# Patient Record
Sex: MALE | Race: BLACK OR AFRICAN AMERICAN | NOT HISPANIC OR LATINO | Employment: UNEMPLOYED | RURAL
[De-identification: names, ages, dates, MRNs, and addresses within clinical notes are randomized per-mention and may not be internally consistent; named-entity substitution may affect disease eponyms.]

---

## 2020-07-27 ENCOUNTER — HISTORICAL (OUTPATIENT)
Dept: ADMINISTRATIVE | Facility: HOSPITAL | Age: 25
End: 2020-07-27

## 2021-04-14 ENCOUNTER — OFFICE VISIT (OUTPATIENT)
Dept: FAMILY MEDICINE | Facility: CLINIC | Age: 26
End: 2021-04-14
Payer: MEDICAID

## 2021-04-14 VITALS
OXYGEN SATURATION: 100 % | RESPIRATION RATE: 17 BRPM | SYSTOLIC BLOOD PRESSURE: 132 MMHG | DIASTOLIC BLOOD PRESSURE: 78 MMHG | HEIGHT: 76 IN | TEMPERATURE: 98 F | HEART RATE: 86 BPM | BODY MASS INDEX: 26.2 KG/M2 | WEIGHT: 215.19 LBS

## 2021-04-14 DIAGNOSIS — K04.7 DENTAL ABSCESS: Primary | ICD-10-CM

## 2021-04-14 PROCEDURE — 96372 PR INJECTION,THERAP/PROPH/DIAG2ST, IM OR SUBCUT: ICD-10-PCS | Mod: ,,, | Performed by: FAMILY MEDICINE

## 2021-04-14 PROCEDURE — 99213 PR OFFICE/OUTPT VISIT, EST, LEVL III, 20-29 MIN: ICD-10-PCS | Mod: 25,,, | Performed by: FAMILY MEDICINE

## 2021-04-14 PROCEDURE — 96372 THER/PROPH/DIAG INJ SC/IM: CPT | Mod: ,,, | Performed by: FAMILY MEDICINE

## 2021-04-14 PROCEDURE — 99213 OFFICE O/P EST LOW 20 MIN: CPT | Mod: 25,,, | Performed by: FAMILY MEDICINE

## 2021-04-14 RX ORDER — METFORMIN HYDROCHLORIDE 1000 MG/1
1000 TABLET ORAL 2 TIMES DAILY WITH MEALS
COMMUNITY
End: 2021-06-07 | Stop reason: SDUPTHER

## 2021-04-14 RX ORDER — INSULIN ASPART 100 [IU]/ML
INJECTION, SOLUTION INTRAVENOUS; SUBCUTANEOUS
COMMUNITY
Start: 2021-04-02 | End: 2021-06-07 | Stop reason: SDUPTHER

## 2021-04-14 RX ORDER — CLINDAMYCIN HYDROCHLORIDE 300 MG/1
300 CAPSULE ORAL 3 TIMES DAILY
Qty: 21 CAPSULE | Refills: 0 | Status: SHIPPED | OUTPATIENT
Start: 2021-04-14 | End: 2021-04-21

## 2021-04-14 RX ORDER — IBUPROFEN 800 MG/1
800 TABLET ORAL 3 TIMES DAILY PRN
Qty: 20 TABLET | Refills: 0 | Status: SHIPPED | OUTPATIENT
Start: 2021-04-14 | End: 2023-01-18

## 2021-04-14 RX ORDER — CLINDAMYCIN PHOSPHATE 150 MG/ML
300 INJECTION, SOLUTION INTRAVENOUS
Status: COMPLETED | OUTPATIENT
Start: 2021-04-14 | End: 2021-04-14

## 2021-04-14 RX ORDER — INSULIN GLARGINE 100 [IU]/ML
INJECTION, SOLUTION SUBCUTANEOUS
COMMUNITY
Start: 2021-04-02 | End: 2021-06-07 | Stop reason: SDUPTHER

## 2021-04-14 RX ORDER — KETOROLAC TROMETHAMINE 30 MG/ML
60 INJECTION, SOLUTION INTRAMUSCULAR; INTRAVENOUS
Status: COMPLETED | OUTPATIENT
Start: 2021-04-14 | End: 2021-04-14

## 2021-04-14 RX ORDER — TRAMADOL HYDROCHLORIDE 50 MG/1
50 TABLET ORAL EVERY 12 HOURS PRN
Qty: 10 TABLET | Refills: 0 | Status: SHIPPED | OUTPATIENT
Start: 2021-04-14 | End: 2023-03-23 | Stop reason: SDUPTHER

## 2021-04-14 RX ADMIN — KETOROLAC TROMETHAMINE 60 MG: 30 INJECTION, SOLUTION INTRAMUSCULAR; INTRAVENOUS at 03:04

## 2021-04-14 RX ADMIN — CLINDAMYCIN PHOSPHATE 300 MG: 150 INJECTION, SOLUTION INTRAVENOUS at 03:04

## 2021-06-07 ENCOUNTER — OFFICE VISIT (OUTPATIENT)
Dept: FAMILY MEDICINE | Facility: CLINIC | Age: 26
End: 2021-06-07
Payer: MEDICAID

## 2021-06-07 VITALS
OXYGEN SATURATION: 97 % | SYSTOLIC BLOOD PRESSURE: 115 MMHG | HEART RATE: 87 BPM | BODY MASS INDEX: 24.4 KG/M2 | WEIGHT: 200.38 LBS | TEMPERATURE: 98 F | DIASTOLIC BLOOD PRESSURE: 85 MMHG | HEIGHT: 76 IN

## 2021-06-07 DIAGNOSIS — Z11.59 ENCOUNTER FOR HEPATITIS C SCREENING TEST FOR LOW RISK PATIENT: ICD-10-CM

## 2021-06-07 DIAGNOSIS — J30.9 ALLERGIC RHINITIS, UNSPECIFIED SEASONALITY, UNSPECIFIED TRIGGER: ICD-10-CM

## 2021-06-07 DIAGNOSIS — Z79.4 TYPE 2 DIABETES MELLITUS WITH DIABETIC NEUROPATHY, WITH LONG-TERM CURRENT USE OF INSULIN: ICD-10-CM

## 2021-06-07 DIAGNOSIS — E11.40 TYPE 2 DIABETES MELLITUS WITH DIABETIC NEUROPATHY, WITH LONG-TERM CURRENT USE OF INSULIN: ICD-10-CM

## 2021-06-07 DIAGNOSIS — K04.7 DENTAL INFECTION: Chronic | ICD-10-CM

## 2021-06-07 DIAGNOSIS — R63.4 WEIGHT LOSS, UNINTENTIONAL: Primary | ICD-10-CM

## 2021-06-07 DIAGNOSIS — B37.0 ORAL THRUSH: ICD-10-CM

## 2021-06-07 DIAGNOSIS — Z72.0 SMOKING TRYING TO QUIT: ICD-10-CM

## 2021-06-07 LAB
ALBUMIN SERPL BCP-MCNC: 4.2 G/DL (ref 3.5–5)
ALBUMIN/GLOB SERPL: 1.2 {RATIO}
ALP SERPL-CCNC: 160 U/L (ref 45–115)
ALT SERPL W P-5'-P-CCNC: 24 U/L (ref 16–61)
ANION GAP SERPL CALCULATED.3IONS-SCNC: 11 MMOL/L (ref 7–16)
AST SERPL W P-5'-P-CCNC: 12 U/L (ref 15–37)
BASOPHILS # BLD AUTO: 0.02 K/UL (ref 0–0.2)
BASOPHILS NFR BLD AUTO: 0.5 % (ref 0–1)
BILIRUB SERPL-MCNC: 0.7 MG/DL (ref 0–1.2)
BUN SERPL-MCNC: 14 MG/DL (ref 7–18)
BUN/CREAT SERPL: 14 (ref 6–20)
CALCIUM SERPL-MCNC: 9.3 MG/DL (ref 8.5–10.1)
CHLORIDE SERPL-SCNC: 100 MMOL/L (ref 98–107)
CO2 SERPL-SCNC: 27 MMOL/L (ref 21–32)
CREAT SERPL-MCNC: 1.01 MG/DL (ref 0.7–1.3)
DIFFERENTIAL METHOD BLD: ABNORMAL
EOSINOPHIL # BLD AUTO: 0.03 K/UL (ref 0–0.5)
EOSINOPHIL NFR BLD AUTO: 0.8 % (ref 1–4)
ERYTHROCYTE [DISTWIDTH] IN BLOOD BY AUTOMATED COUNT: 11.9 % (ref 11.5–14.5)
EST. AVERAGE GLUCOSE BLD GHB EST-MCNC: 340 MG/DL
GLOBULIN SER-MCNC: 3.6 G/DL (ref 2–4)
GLUCOSE SERPL-MCNC: 369 MG/DL (ref 74–106)
HBA1C MFR BLD HPLC: 12.8 % (ref 4.5–6.6)
HCT VFR BLD AUTO: 46.6 % (ref 40–54)
HCV AB SER QL: NORMAL
HGB BLD-MCNC: 15.7 G/DL (ref 13.5–18)
HIV 1+O+2 AB SERPL QL: NORMAL
IMM GRANULOCYTES # BLD AUTO: 0.01 K/UL (ref 0–0.04)
IMM GRANULOCYTES NFR BLD: 0.3 % (ref 0–0.4)
LYMPHOCYTES # BLD AUTO: 1.15 K/UL (ref 1–4.8)
LYMPHOCYTES NFR BLD AUTO: 29.2 % (ref 27–41)
MCH RBC QN AUTO: 30 PG (ref 27–31)
MCHC RBC AUTO-ENTMCNC: 33.7 G/DL (ref 32–36)
MCV RBC AUTO: 89.1 FL (ref 80–96)
MONOCYTES # BLD AUTO: 0.57 K/UL (ref 0–0.8)
MONOCYTES NFR BLD AUTO: 14.5 % (ref 2–6)
MPC BLD CALC-MCNC: 12.8 FL (ref 9.4–12.4)
NEUTROPHILS # BLD AUTO: 2.16 K/UL (ref 1.8–7.7)
NEUTROPHILS NFR BLD AUTO: 54.7 % (ref 53–65)
NRBC # BLD AUTO: 0 X10E3/UL
NRBC, AUTO (.00): 0 %
PLATELET # BLD AUTO: 206 K/UL (ref 150–400)
POTASSIUM SERPL-SCNC: 4.1 MMOL/L (ref 3.5–5.1)
PROT SERPL-MCNC: 7.8 G/DL (ref 6.4–8.2)
RBC # BLD AUTO: 5.23 M/UL (ref 4.6–6.2)
SODIUM SERPL-SCNC: 134 MMOL/L (ref 136–145)
T4 FREE SERPL-MCNC: 1.55 NG/DL (ref 0.76–1.46)
TSH SERPL DL<=0.005 MIU/L-ACNC: 0.76 UIU/ML (ref 0.36–3.74)
WBC # BLD AUTO: 3.94 K/UL (ref 4.5–11)

## 2021-06-07 PROCEDURE — 84439 ASSAY OF FREE THYROXINE: CPT | Mod: ,,, | Performed by: CLINICAL MEDICAL LABORATORY

## 2021-06-07 PROCEDURE — 86803 HEPATITIS C ANTIBODY: ICD-10-PCS | Mod: ,,, | Performed by: CLINICAL MEDICAL LABORATORY

## 2021-06-07 PROCEDURE — 84443 THYROID PANEL: ICD-10-PCS | Mod: ,,, | Performed by: CLINICAL MEDICAL LABORATORY

## 2021-06-07 PROCEDURE — 85025 COMPLETE CBC W/AUTO DIFF WBC: CPT | Mod: ,,, | Performed by: CLINICAL MEDICAL LABORATORY

## 2021-06-07 PROCEDURE — 80053 COMPREHENSIVE METABOLIC PANEL: ICD-10-PCS | Mod: ,,, | Performed by: CLINICAL MEDICAL LABORATORY

## 2021-06-07 PROCEDURE — 86703 HIV-1/HIV-2 1 RESULT ANTBDY: CPT | Mod: ,,, | Performed by: CLINICAL MEDICAL LABORATORY

## 2021-06-07 PROCEDURE — 86703 HIV 1 / 2 ANTIBODY: ICD-10-PCS | Mod: ,,, | Performed by: CLINICAL MEDICAL LABORATORY

## 2021-06-07 PROCEDURE — 83036 HEMOGLOBIN A1C: ICD-10-PCS | Mod: ,,, | Performed by: CLINICAL MEDICAL LABORATORY

## 2021-06-07 PROCEDURE — 84443 ASSAY THYROID STIM HORMONE: CPT | Mod: ,,, | Performed by: CLINICAL MEDICAL LABORATORY

## 2021-06-07 PROCEDURE — 83036 HEMOGLOBIN GLYCOSYLATED A1C: CPT | Mod: ,,, | Performed by: CLINICAL MEDICAL LABORATORY

## 2021-06-07 PROCEDURE — 85025 CBC WITH DIFFERENTIAL: ICD-10-PCS | Mod: ,,, | Performed by: CLINICAL MEDICAL LABORATORY

## 2021-06-07 PROCEDURE — 99214 PR OFFICE/OUTPT VISIT, EST, LEVL IV, 30-39 MIN: ICD-10-PCS | Mod: GC,,, | Performed by: FAMILY MEDICINE

## 2021-06-07 PROCEDURE — 86803 HEPATITIS C AB TEST: CPT | Mod: ,,, | Performed by: CLINICAL MEDICAL LABORATORY

## 2021-06-07 PROCEDURE — 99214 OFFICE O/P EST MOD 30 MIN: CPT | Mod: GC,,, | Performed by: FAMILY MEDICINE

## 2021-06-07 PROCEDURE — 80053 COMPREHEN METABOLIC PANEL: CPT | Mod: ,,, | Performed by: CLINICAL MEDICAL LABORATORY

## 2021-06-07 PROCEDURE — 84439 THYROID PANEL: ICD-10-PCS | Mod: ,,, | Performed by: CLINICAL MEDICAL LABORATORY

## 2021-06-07 RX ORDER — INSULIN GLARGINE 100 [IU]/ML
INJECTION, SOLUTION SUBCUTANEOUS
Qty: 3 ML | Refills: 11 | Status: SHIPPED | OUTPATIENT
Start: 2021-06-07 | End: 2021-06-17 | Stop reason: SDUPTHER

## 2021-06-07 RX ORDER — FLUTICASONE PROPIONATE 50 MCG
1 SPRAY, SUSPENSION (ML) NASAL DAILY
Qty: 9.9 ML | Refills: 3 | Status: SHIPPED | OUTPATIENT
Start: 2021-06-07 | End: 2023-01-18

## 2021-06-07 RX ORDER — METFORMIN HYDROCHLORIDE 1000 MG/1
1000 TABLET ORAL 2 TIMES DAILY WITH MEALS
Qty: 60 TABLET | Refills: 3 | Status: SHIPPED | OUTPATIENT
Start: 2021-06-07 | End: 2023-01-18

## 2021-06-07 RX ORDER — IBUPROFEN 200 MG
1 TABLET ORAL DAILY
Qty: 14 PATCH | Refills: 0 | Status: SHIPPED | OUTPATIENT
Start: 2021-06-07 | End: 2021-06-21

## 2021-06-07 RX ORDER — NICOTINE 7MG/24HR
1 PATCH, TRANSDERMAL 24 HOURS TRANSDERMAL DAILY
Qty: 14 PATCH | Refills: 0 | Status: SHIPPED | OUTPATIENT
Start: 2021-06-07 | End: 2023-01-18

## 2021-06-07 RX ORDER — NYSTATIN 100000 [USP'U]/ML
4 SUSPENSION ORAL 4 TIMES DAILY
Qty: 160 ML | Refills: 0 | Status: SHIPPED | OUTPATIENT
Start: 2021-06-07 | End: 2021-06-17

## 2021-06-07 RX ORDER — INSULIN ASPART 100 [IU]/ML
INJECTION, SOLUTION INTRAVENOUS; SUBCUTANEOUS
Qty: 3 ML | Refills: 11 | Status: SHIPPED | OUTPATIENT
Start: 2021-06-07 | End: 2021-06-17

## 2021-06-08 ENCOUNTER — TELEPHONE (OUTPATIENT)
Dept: FAMILY MEDICINE | Facility: CLINIC | Age: 26
End: 2021-06-08

## 2021-06-08 DIAGNOSIS — K04.7 DENTAL INFECTION: Primary | ICD-10-CM

## 2021-06-08 RX ORDER — AMOXICILLIN 500 MG/1
CAPSULE ORAL
Qty: 11 CAPSULE | Refills: 0 | Status: SHIPPED | OUTPATIENT
Start: 2021-06-09 | End: 2021-06-13

## 2021-08-02 LAB
LEFT EYE DM RETINOPATHY: NEGATIVE
RIGHT EYE DM RETINOPATHY: NEGATIVE

## 2022-01-03 ENCOUNTER — APPOINTMENT (OUTPATIENT)
Dept: RADIOLOGY | Facility: CLINIC | Age: 27
End: 2022-01-03
Attending: FAMILY MEDICINE
Payer: MEDICAID

## 2022-01-03 ENCOUNTER — OFFICE VISIT (OUTPATIENT)
Dept: FAMILY MEDICINE | Facility: CLINIC | Age: 27
End: 2022-01-03
Payer: MEDICAID

## 2022-01-03 VITALS
HEIGHT: 76 IN | BODY MASS INDEX: 22.53 KG/M2 | WEIGHT: 185 LBS | DIASTOLIC BLOOD PRESSURE: 82 MMHG | OXYGEN SATURATION: 97 % | RESPIRATION RATE: 18 BRPM | HEART RATE: 79 BPM | TEMPERATURE: 97 F | SYSTOLIC BLOOD PRESSURE: 127 MMHG

## 2022-01-03 DIAGNOSIS — R63.4 WEIGHT LOSS, UNINTENTIONAL: ICD-10-CM

## 2022-01-03 DIAGNOSIS — Z79.4 TYPE 2 DIABETES MELLITUS WITH DIABETIC NEUROPATHY, WITH LONG-TERM CURRENT USE OF INSULIN: Primary | ICD-10-CM

## 2022-01-03 DIAGNOSIS — E11.40 TYPE 2 DIABETES MELLITUS WITH DIABETIC NEUROPATHY, WITH LONG-TERM CURRENT USE OF INSULIN: Primary | ICD-10-CM

## 2022-01-03 DIAGNOSIS — M41.9 SCOLIOSIS, UNSPECIFIED SCOLIOSIS TYPE, UNSPECIFIED SPINAL REGION: ICD-10-CM

## 2022-01-03 DIAGNOSIS — M54.9 BACK PAIN, UNSPECIFIED BACK LOCATION, UNSPECIFIED BACK PAIN LATERALITY, UNSPECIFIED CHRONICITY: ICD-10-CM

## 2022-01-03 DIAGNOSIS — R45.89 DEPRESSED MOOD: ICD-10-CM

## 2022-01-03 LAB
ALBUMIN SERPL BCP-MCNC: 3.7 G/DL (ref 3.5–5)
ALBUMIN/GLOB SERPL: 0.9 {RATIO}
ALP SERPL-CCNC: 139 U/L (ref 45–115)
ALT SERPL W P-5'-P-CCNC: 33 U/L (ref 16–61)
ANION GAP SERPL CALCULATED.3IONS-SCNC: 6 MMOL/L (ref 7–16)
AST SERPL W P-5'-P-CCNC: 16 U/L (ref 15–37)
BASOPHILS # BLD AUTO: 0.02 K/UL (ref 0–0.2)
BASOPHILS NFR BLD AUTO: 0.3 % (ref 0–1)
BILIRUB SERPL-MCNC: 0.6 MG/DL (ref 0–1.2)
BUN SERPL-MCNC: 7 MG/DL (ref 7–18)
BUN/CREAT SERPL: 8 (ref 6–20)
CALCIUM SERPL-MCNC: 9.3 MG/DL (ref 8.5–10.1)
CHLORIDE SERPL-SCNC: 99 MMOL/L (ref 98–107)
CO2 SERPL-SCNC: 33 MMOL/L (ref 21–32)
CREAT SERPL-MCNC: 0.87 MG/DL (ref 0.7–1.3)
DIFFERENTIAL METHOD BLD: ABNORMAL
EOSINOPHIL # BLD AUTO: 0.05 K/UL (ref 0–0.5)
EOSINOPHIL NFR BLD AUTO: 0.8 % (ref 1–4)
ERYTHROCYTE [DISTWIDTH] IN BLOOD BY AUTOMATED COUNT: 11.9 % (ref 11.5–14.5)
GLOBULIN SER-MCNC: 4.2 G/DL (ref 2–4)
GLUCOSE SERPL-MCNC: 431 MG/DL (ref 74–106)
GLUCOSE SERPL-MCNC: 437 MG/DL (ref 70–110)
HCT VFR BLD AUTO: 45.4 % (ref 40–54)
HGB BLD-MCNC: 15.4 G/DL (ref 13.5–18)
IMM GRANULOCYTES # BLD AUTO: 0.02 K/UL (ref 0–0.04)
IMM GRANULOCYTES NFR BLD: 0.3 % (ref 0–0.4)
LYMPHOCYTES # BLD AUTO: 2.28 K/UL (ref 1–4.8)
LYMPHOCYTES NFR BLD AUTO: 36.2 % (ref 27–41)
MCH RBC QN AUTO: 30.4 PG (ref 27–31)
MCHC RBC AUTO-ENTMCNC: 33.9 G/DL (ref 32–36)
MCV RBC AUTO: 89.5 FL (ref 80–96)
MONOCYTES # BLD AUTO: 0.48 K/UL (ref 0–0.8)
MONOCYTES NFR BLD AUTO: 7.6 % (ref 2–6)
MPC BLD CALC-MCNC: 12.2 FL (ref 9.4–12.4)
NEUTROPHILS # BLD AUTO: 3.44 K/UL (ref 1.8–7.7)
NEUTROPHILS NFR BLD AUTO: 54.8 % (ref 53–65)
NRBC # BLD AUTO: 0 X10E3/UL
NRBC, AUTO (.00): 0 %
PLATELET # BLD AUTO: 273 K/UL (ref 150–400)
POTASSIUM SERPL-SCNC: 3.9 MMOL/L (ref 3.5–5.1)
PROT SERPL-MCNC: 7.9 G/DL (ref 6.4–8.2)
RBC # BLD AUTO: 5.07 M/UL (ref 4.6–6.2)
SODIUM SERPL-SCNC: 134 MMOL/L (ref 136–145)
WBC # BLD AUTO: 6.29 K/UL (ref 4.5–11)

## 2022-01-03 PROCEDURE — 99214 PR OFFICE/OUTPT VISIT, EST, LEVL IV, 30-39 MIN: ICD-10-PCS | Mod: GC,,, | Performed by: FAMILY MEDICINE

## 2022-01-03 PROCEDURE — 80053 COMPREHENSIVE METABOLIC PANEL: ICD-10-PCS | Mod: ,,, | Performed by: CLINICAL MEDICAL LABORATORY

## 2022-01-03 PROCEDURE — 72081 X-RAY EXAM ENTIRE SPI 1 VW: CPT | Mod: 26,,, | Performed by: RADIOLOGY

## 2022-01-03 PROCEDURE — 99214 OFFICE O/P EST MOD 30 MIN: CPT | Mod: GC,,, | Performed by: FAMILY MEDICINE

## 2022-01-03 PROCEDURE — 85025 CBC WITH DIFFERENTIAL: ICD-10-PCS | Mod: ,,, | Performed by: CLINICAL MEDICAL LABORATORY

## 2022-01-03 PROCEDURE — 72081 X-RAY EXAM ENTIRE SPI 1 VW: CPT | Mod: TC,RHCUB | Performed by: FAMILY MEDICINE

## 2022-01-03 PROCEDURE — 83036 HEMOGLOBIN A1C: ICD-10-PCS | Mod: 90,,, | Performed by: CLINICAL MEDICAL LABORATORY

## 2022-01-03 PROCEDURE — 85025 COMPLETE CBC W/AUTO DIFF WBC: CPT | Mod: ,,, | Performed by: CLINICAL MEDICAL LABORATORY

## 2022-01-03 PROCEDURE — 72081 XR SPINE SCOLIOSIS 1 VIEW_SUPINE OR ERECT: ICD-10-PCS | Mod: 26,,, | Performed by: RADIOLOGY

## 2022-01-03 PROCEDURE — 83036 HEMOGLOBIN GLYCOSYLATED A1C: CPT | Mod: 90,,, | Performed by: CLINICAL MEDICAL LABORATORY

## 2022-01-03 PROCEDURE — 80053 COMPREHEN METABOLIC PANEL: CPT | Mod: ,,, | Performed by: CLINICAL MEDICAL LABORATORY

## 2022-01-03 RX ORDER — ESCITALOPRAM OXALATE 10 MG/1
10 TABLET ORAL DAILY
Qty: 30 TABLET | Refills: 0 | Status: SHIPPED | OUTPATIENT
Start: 2022-01-03 | End: 2022-01-05 | Stop reason: SDUPTHER

## 2022-01-03 NOTE — PROGRESS NOTES
"        Celso Lainez, DO  [address], [place] [address]  Phone: [phone]     Subjective     Name: Low Gutierrez   YOB: 1995 (26 y.o.)  MRN: [id number]  Visit Date: 01/03/2022   Chief Complaint: Check Up (Diabetes)        HISTORY OF PRESENT ILLNESS:  Patient is a 27 yo  male presenting today with a cc of "diabetes check up." At last visit (6/7/21) patients BG was noted to be 369 with a1c of 12.8. He states that since his last visit he has continued to lose weight, a total of about 50 lbs as of today. He states that his appetite is still decreased ever since having COVID-19 several months ago. The accucheck at this visit revealed a BG of 427. I discussed with the patient about his medication adherence, he stated that he has been taking his metformin regularly, skipping his meal time insulin, and taking his 22 units at night. After further discussion, it appears as tho the patient might have been taking novolog 22 units at night instead of detemir. Patient states that he does not like having to stick himself multiple times a day.    Patient states that he has a history of scoliosis and has been having significant back pain for about 6 months now. I will get an xray to see severity of scoliosis. The patient will be scheduled for OMT with me in 1 week.    The patient also states that he has been depressed and stressed lately. He describes getting very angry about small things and not enjoying activities that he used to enjoy. He also endorses insomnia and lack of appetite. Will start on Lexapro.        PAST MEDICAL HISTORY:  Significant Diagnoses - Patient  has a past medical history of Diabetes mellitus, type 2.  Medications - Patient has a current medication list which includes the following long-term medication(s): insulin aspart u-100, levemir flextouch u-100 insuln, metformin, and escitalopram oxalate.   Allergies - Patient is allergic to wasp venom.  Surgeries - " Patient  has no past surgical history on file.  Family History - Patient family history is not on file.      SOCIAL HISTORY:  Tobacco - Patient  reports that he has been smoking cigarettes. He has never used smokeless tobacco.   Alcohol - Patient  reports current alcohol use.   Recreational Drugs - Patient  reports no history of drug use.       Review of Systems   Constitutional: Positive for appetite change and fatigue. Negative for chills, diaphoresis and fever.   Respiratory: Negative for cough, chest tightness, shortness of breath and wheezing.    Cardiovascular: Negative for chest pain, palpitations and leg swelling.   Gastrointestinal: Negative for abdominal distention, abdominal pain, diarrhea, nausea and vomiting.   Musculoskeletal: Positive for back pain. Negative for arthralgias, gait problem and myalgias.   Psychiatric/Behavioral: Positive for agitation and sleep disturbance.   All other systems reviewed and are negative.         Past Medical History:   Diagnosis Date    Diabetes mellitus, type 2         Review of patient's allergies indicates:   Allergen Reactions    Wasp venom         History reviewed. No pertinent surgical history.     History reviewed. No pertinent family history.    Current Outpatient Medications   Medication Instructions    EScitalopram oxalate (LEXAPRO) 10 mg, Oral, Daily    fluticasone propionate (FLONASE) 50 mcg, Each Nostril, Daily    ibuprofen (ADVIL,MOTRIN) 800 mg, Oral, 3 times daily PRN    insulin aspart U-100 (NOVOLOG) 100 unit/mL (3 mL) InPn pen INJECT 7 UNITS SUB-Q 3 TIMES DAILY BEFORE MEALS    LEVEMIR FLEXTOUCH U-100 INSULN 22 Units, Subcutaneous, Daily    metFORMIN (GLUCOPHAGE) 1,000 mg, Oral, 2 times daily with meals    nicotine (NICODERM CQ) 7 mg/24 hr 1 patch, Transdermal, Daily    traMADoL (ULTRAM) 50 mg, Oral, Every 12 hours PRN        Objective     /82 (BP Location: Left arm, Patient Position: Sitting)   Pulse 79   Temp 97 °F (36.1 °C) (Temporal)  "  Resp 18   Ht 6' 4" (1.93 m)   Wt 83.9 kg (185 lb)   SpO2 97%   BMI 22.52 kg/m²     Physical Exam  Vitals and nursing note reviewed.   Constitutional:       General: He is awake. He is not in acute distress.     Appearance: Normal appearance. He is well-developed and normal weight. He is not ill-appearing.   HENT:      Head: Normocephalic and atraumatic.      Nose: Nose normal. No congestion or rhinorrhea.      Mouth/Throat:      Mouth: Mucous membranes are dry.      Pharynx: Oropharynx is clear. No oropharyngeal exudate or posterior oropharyngeal erythema.   Eyes:      General: No scleral icterus.     Extraocular Movements: Extraocular movements intact.      Conjunctiva/sclera: Conjunctivae normal.      Pupils: Pupils are equal, round, and reactive to light.   Cardiovascular:      Rate and Rhythm: Normal rate and regular rhythm.      Pulses: Normal pulses.      Heart sounds: Normal heart sounds, S1 normal and S2 normal. No murmur heard.  No friction rub. No gallop.    Pulmonary:      Effort: Pulmonary effort is normal. No accessory muscle usage, prolonged expiration or respiratory distress.      Breath sounds: Normal breath sounds and air entry. No wheezing, rhonchi or rales.   Abdominal:      General: Abdomen is flat. Bowel sounds are normal. There is no distension.      Palpations: Abdomen is soft.      Tenderness: There is no abdominal tenderness. There is no guarding or rebound.   Musculoskeletal:         General: Normal range of motion.      Cervical back: Normal range of motion and neck supple.      Right lower leg: No edema.      Left lower leg: No edema.   Skin:     General: Skin is warm and dry.   Neurological:      General: No focal deficit present.      Mental Status: He is alert and oriented to person, place, and time. Mental status is at baseline.   Psychiatric:         Mood and Affect: Mood is depressed.         Behavior: Behavior normal. Behavior is cooperative.         Thought Content: Thought " content normal.         Judgment: Judgment normal.          All recently obtained labs have been reviewed and discussed in detail with the patient.   Assessment     1. Type 2 diabetes mellitus with diabetic neuropathy, with long-term current use of insulin    2. Weight loss, unintentional    3. Scoliosis, unspecified scoliosis type, unspecified spinal region    4. Depressed mood    5. Back pain, unspecified back location, unspecified back pain laterality, unspecified chronicity         Plan        Problem List Items Addressed This Visit        Psychiatric    Depressed mood     Patient states that he thinks he needs a medication for depression at this time.  - Begin Lexapro 10mg qhs  - Follow up 1 month            Endocrine    Weight loss, unintentional    Relevant Orders    Comprehensive Metabolic Panel (Completed)    CBC Auto Differential (Completed)    Type 2 diabetes mellitus with diabetic neuropathy, with long-term current use of insulin - Primary     Patients BG was 427 today. He states that he has not been taking his insulin as described above.  - Stop meal time insulin  - Only take Detemir 25 units qhs  - Instructed to check BG at least 2x per day for the next month  - C peptide, a1c pending  - Follow up 1 month         Relevant Orders    Hemoglobin A1C    POCT glucose (Completed)    C-Peptide       Orthopedic    Scoliosis    Relevant Orders    X-Ray Spine Scoliosis 1 View_Supine or Erect (Completed)    Back pain     Pt reports approximately 6 mo history of back pain.  - Xray pending  - OMT scheduled for 1 week               Follow up in about 1 week (around 1/10/2022), or if symptoms worsen or fail to improve, for OMT of back.    Celso Lainez DO  [facility]

## 2022-01-03 NOTE — ASSESSMENT & PLAN NOTE
Patients BG was 427 today. He states that he has not been taking his insulin as described above.  - Stop meal time insulin  - Only take Detemir 25 units qhs  - Instructed to check BG at least 2x per day for the next month  - C peptide, a1c pending  - Follow up 1 month

## 2022-01-03 NOTE — PROGRESS NOTES
Results of POC Glucose discussed in person with patient. Patient states that he has not been taking insulin as he should. It appears as tho he might have been taking his Novalog 22 units at night instead of Levemir. Patient states that he doesn't like sticking himself multiple times a day. Will get patient to stop with meal insulin, and increase levemir to 22 units qhs, follow up one month. Patient states that he will be compliant with this regimen.

## 2022-01-03 NOTE — ASSESSMENT & PLAN NOTE
Patient states that he thinks he needs a medication for depression at this time.  - Begin Lexapro 10mg qhs  - Follow up 1 month

## 2022-01-05 LAB — HBA1C MFR BLD: 15.9 % (ref 4–5.6)

## 2022-01-05 RX ORDER — ESCITALOPRAM OXALATE 10 MG/1
10 TABLET ORAL DAILY
Qty: 30 TABLET | Refills: 0 | Status: SHIPPED | OUTPATIENT
Start: 2022-01-05 | End: 2022-02-04

## 2023-01-04 ENCOUNTER — OFFICE VISIT (OUTPATIENT)
Dept: FAMILY MEDICINE | Facility: CLINIC | Age: 28
End: 2023-01-04
Payer: MEDICARE

## 2023-01-04 VITALS
DIASTOLIC BLOOD PRESSURE: 90 MMHG | RESPIRATION RATE: 20 BRPM | SYSTOLIC BLOOD PRESSURE: 135 MMHG | WEIGHT: 150.63 LBS | HEART RATE: 109 BPM | HEIGHT: 76 IN | BODY MASS INDEX: 18.34 KG/M2 | OXYGEN SATURATION: 97 % | TEMPERATURE: 98 F

## 2023-01-04 DIAGNOSIS — Z23 NEED FOR VACCINATION: ICD-10-CM

## 2023-01-04 DIAGNOSIS — Z79.4 TYPE 2 DIABETES MELLITUS WITH DIABETIC NEUROPATHY, WITH LONG-TERM CURRENT USE OF INSULIN: Primary | ICD-10-CM

## 2023-01-04 DIAGNOSIS — Z91.199 NONCOMPLIANCE WITH DIABETES TREATMENT: ICD-10-CM

## 2023-01-04 DIAGNOSIS — E13.69 OTHER SPECIFIED DIABETES MELLITUS WITH OTHER SPECIFIED COMPLICATION, WITH LONG-TERM CURRENT USE OF INSULIN: ICD-10-CM

## 2023-01-04 DIAGNOSIS — R63.4 WEIGHT LOSS, UNINTENTIONAL: ICD-10-CM

## 2023-01-04 DIAGNOSIS — E11.40 TYPE 2 DIABETES MELLITUS WITH DIABETIC NEUROPATHY, WITH LONG-TERM CURRENT USE OF INSULIN: Primary | ICD-10-CM

## 2023-01-04 DIAGNOSIS — Z79.4 OTHER SPECIFIED DIABETES MELLITUS WITH OTHER SPECIFIED COMPLICATION, WITH LONG-TERM CURRENT USE OF INSULIN: ICD-10-CM

## 2023-01-04 DIAGNOSIS — E08.21 DIABETES DUE TO UNDERLYING CONDITION W DIABETIC NEPHROPATHY: ICD-10-CM

## 2023-01-04 LAB
ACETONE SERPL QL SCN: NEGATIVE
ALBUMIN SERPL BCP-MCNC: 4.4 G/DL (ref 3.5–5)
ALBUMIN/GLOB SERPL: 1.3 {RATIO}
ALP SERPL-CCNC: 110 U/L (ref 45–115)
ALT SERPL W P-5'-P-CCNC: 18 U/L (ref 16–61)
ANION GAP SERPL CALCULATED.3IONS-SCNC: 7 MMOL/L (ref 7–16)
AST SERPL W P-5'-P-CCNC: 9 U/L (ref 15–37)
BILIRUB SERPL-MCNC: 0.5 MG/DL (ref ?–1.2)
BUN SERPL-MCNC: 8 MG/DL (ref 7–18)
BUN/CREAT SERPL: 9 (ref 6–20)
C PEPTIDE SERPL-MCNC: 0.68 NG/ML (ref 1.1–4.4)
CALCIUM SERPL-MCNC: 9.9 MG/DL (ref 8.5–10.1)
CHLORIDE SERPL-SCNC: 97 MMOL/L (ref 98–107)
CHOLEST SERPL-MCNC: 196 MG/DL (ref 0–200)
CHOLEST/HDLC SERPL: 3.8 {RATIO}
CO2 SERPL-SCNC: 35 MMOL/L (ref 21–32)
CREAT SERPL-MCNC: 0.93 MG/DL (ref 0.7–1.3)
CREAT UR-MCNC: 24 MG/DL (ref 39–259)
EGFR (NO RACE VARIABLE) (RUSH/TITUS): 116 ML/MIN/1.73M²
EST. AVERAGE GLUCOSE BLD GHB EST-MCNC: 327 MG/DL
GLOBULIN SER-MCNC: 3.5 G/DL (ref 2–4)
GLUCOSE SERPL-MCNC: 553 MG/DL (ref 74–106)
HBA1C MFR BLD HPLC: 12.4 % (ref 4.5–6.6)
HDLC SERPL-MCNC: 51 MG/DL (ref 40–60)
LDLC SERPL CALC-MCNC: 119 MG/DL
LDLC/HDLC SERPL: 2.3 {RATIO}
MICROALBUMIN UR-MCNC: 0.7 MG/DL (ref 0–2.8)
MICROALBUMIN/CREAT RATIO PNL UR: 29.2 MG/G (ref 0–30)
NONHDLC SERPL-MCNC: 145 MG/DL
POTASSIUM SERPL-SCNC: 4 MMOL/L (ref 3.5–5.1)
PROT SERPL-MCNC: 7.9 G/DL (ref 6.4–8.2)
PROT UR QL STRIP: NEGATIVE
SODIUM SERPL-SCNC: 135 MMOL/L (ref 136–145)
TRIGL SERPL-MCNC: 129 MG/DL (ref 35–150)
VLDLC SERPL-MCNC: 26 MG/DL

## 2023-01-04 PROCEDURE — 0011A PR IMMUNIZ ADMIN, SARS-COV-2 COVID-19 VACC, 100MCG/0.5ML, 1ST DOSE: CPT | Mod: ,,, | Performed by: FAMILY MEDICINE

## 2023-01-04 PROCEDURE — 99214 OFFICE O/P EST MOD 30 MIN: CPT | Mod: 25,,, | Performed by: FAMILY MEDICINE

## 2023-01-04 PROCEDURE — 82043 MICROALBUMIN / CREATININE RATIO URINE: ICD-10-PCS | Mod: ,,, | Performed by: CLINICAL MEDICAL LABORATORY

## 2023-01-04 PROCEDURE — 82043 UR ALBUMIN QUANTITATIVE: CPT | Mod: ,,, | Performed by: CLINICAL MEDICAL LABORATORY

## 2023-01-04 PROCEDURE — 80053 COMPREHENSIVE METABOLIC PANEL: ICD-10-PCS | Mod: ,,, | Performed by: CLINICAL MEDICAL LABORATORY

## 2023-01-04 PROCEDURE — 80053 COMPREHEN METABOLIC PANEL: CPT | Mod: ,,, | Performed by: CLINICAL MEDICAL LABORATORY

## 2023-01-04 PROCEDURE — 83036 HEMOGLOBIN A1C: ICD-10-PCS | Mod: ,,, | Performed by: CLINICAL MEDICAL LABORATORY

## 2023-01-04 PROCEDURE — 83036 HEMOGLOBIN GLYCOSYLATED A1C: CPT | Mod: ,,, | Performed by: CLINICAL MEDICAL LABORATORY

## 2023-01-04 PROCEDURE — 84681 ASSAY OF C-PEPTIDE: CPT | Mod: ,,, | Performed by: CLINICAL MEDICAL LABORATORY

## 2023-01-04 PROCEDURE — 91301 COVID-19, MRNA, LNP-S, PF, 100 MCG/0.5 ML DOSE VACCINE: CPT | Mod: ,,, | Performed by: FAMILY MEDICINE

## 2023-01-04 PROCEDURE — 80061 LIPID PANEL: ICD-10-PCS | Mod: ,,, | Performed by: CLINICAL MEDICAL LABORATORY

## 2023-01-04 PROCEDURE — G0008 FLU VACCINE (QUAD) GREATER THAN OR EQUAL TO 3YO PRESERVATIVE FREE IM: ICD-10-PCS | Mod: ,,, | Performed by: FAMILY MEDICINE

## 2023-01-04 PROCEDURE — 90686 IIV4 VACC NO PRSV 0.5 ML IM: CPT | Mod: ,,, | Performed by: FAMILY MEDICINE

## 2023-01-04 PROCEDURE — 0011A PR IMMUNIZ ADMIN, SARS-COV-2 COVID-19 VACC, 100MCG/0.5ML, 1ST DOSE: ICD-10-PCS | Mod: ,,, | Performed by: FAMILY MEDICINE

## 2023-01-04 PROCEDURE — 91301 COVID-19, MRNA, LNP-S, PF, 100 MCG/0.5 ML DOSE VACCINE: ICD-10-PCS | Mod: ,,, | Performed by: FAMILY MEDICINE

## 2023-01-04 PROCEDURE — 82570 MICROALBUMIN / CREATININE RATIO URINE: ICD-10-PCS | Mod: ,,, | Performed by: CLINICAL MEDICAL LABORATORY

## 2023-01-04 PROCEDURE — 90686 FLU VACCINE (QUAD) GREATER THAN OR EQUAL TO 3YO PRESERVATIVE FREE IM: ICD-10-PCS | Mod: ,,, | Performed by: FAMILY MEDICINE

## 2023-01-04 PROCEDURE — 82570 ASSAY OF URINE CREATININE: CPT | Mod: ,,, | Performed by: CLINICAL MEDICAL LABORATORY

## 2023-01-04 PROCEDURE — 84681 C-PEPTIDE: ICD-10-PCS | Mod: ,,, | Performed by: CLINICAL MEDICAL LABORATORY

## 2023-01-04 PROCEDURE — 81002 URINALYSIS NONAUTO W/O SCOPE: CPT | Mod: ,,, | Performed by: CLINICAL MEDICAL LABORATORY

## 2023-01-04 PROCEDURE — G0008 ADMIN INFLUENZA VIRUS VAC: HCPCS | Mod: ,,, | Performed by: FAMILY MEDICINE

## 2023-01-04 PROCEDURE — 80061 LIPID PANEL: CPT | Mod: ,,, | Performed by: CLINICAL MEDICAL LABORATORY

## 2023-01-04 PROCEDURE — 99214 PR OFFICE/OUTPT VISIT, EST, LEVL IV, 30-39 MIN: ICD-10-PCS | Mod: 25,,, | Performed by: FAMILY MEDICINE

## 2023-01-04 PROCEDURE — 81002: ICD-10-PCS | Mod: ,,, | Performed by: CLINICAL MEDICAL LABORATORY

## 2023-01-04 RX ORDER — INSULIN DETEMIR 100 [IU]/ML
20 INJECTION, SOLUTION SUBCUTANEOUS NIGHTLY
Qty: 18 ML | Refills: 3 | Status: SHIPPED | OUTPATIENT
Start: 2023-01-04 | End: 2023-01-18 | Stop reason: SDUPTHER

## 2023-01-04 RX ORDER — SYRINGE,SAFETY WITH NEEDLE,1ML 25GX1"
1 SYRINGE (EA) MISCELLANEOUS 4 TIMES DAILY
Qty: 100 EACH | Refills: 11 | Status: SHIPPED | OUTPATIENT
Start: 2023-01-04

## 2023-01-04 RX ORDER — INSULIN PUMP SYRINGE, 3 ML
EACH MISCELLANEOUS
Qty: 1 EACH | Refills: 5 | Status: SHIPPED | OUTPATIENT
Start: 2023-01-04 | End: 2024-01-04

## 2023-01-04 RX ORDER — INSULIN ASPART 100 [IU]/ML
10 INJECTION, SOLUTION INTRAVENOUS; SUBCUTANEOUS
Qty: 15 ML | Refills: 2 | Status: SHIPPED | OUTPATIENT
Start: 2023-01-04 | End: 2023-01-18 | Stop reason: SDUPTHER

## 2023-01-04 RX ORDER — GABAPENTIN 300 MG/1
300 CAPSULE ORAL 3 TIMES DAILY
Qty: 90 CAPSULE | Refills: 11 | Status: SHIPPED | OUTPATIENT
Start: 2023-01-04 | End: 2023-01-04

## 2023-01-04 RX ORDER — GABAPENTIN 300 MG/1
300 CAPSULE ORAL 3 TIMES DAILY
Qty: 90 CAPSULE | Refills: 11 | Status: SHIPPED | OUTPATIENT
Start: 2023-01-04 | End: 2023-01-25 | Stop reason: SDUPTHER

## 2023-01-04 NOTE — PROGRESS NOTES
Subjective:       Patient ID: Low Gutierrez is a 27 y.o. male.    Chief Complaint: Diabetes, Foot Pain (Bilateral), and Leg Pain (Bilateral)    Patient is a 26yo AA male with a PMH of DM, depression, scoliosis, and chronic back pain who presents to [facility] with diabetes and bilateral leg and foot pain. Patient has a strong family history of diabetes and was last seen here in the clinic 1 year ago. Patient's medications were adjusted and he was started on Levemir 22U qhs, Metformin 1g BID, and Aspart 7U TIDWM. However, patient reports that he wasn't certain on which medications to take and he ran out of the insulin syringe needles. As a result he was noncompliant with his medications for about 1-2 years. Patient's last A1c was 15.9 and glucose 431 on 1/3/22 in the clinic. He now reports of bilateral leg and foot pain with numbness that decrease his ability to walk. He reports unintentional weight loss of 130 lbs in the past 2 years (loss of 65 lbs from clinic record). Patient endorses diarrhea every other day. He denies bloody stool, dysuria, or hematuria. Patient denies CP, SOB, abdominal pain, nausea, vomiting.       Current Outpatient Medications:     blood-glucose meter kit, Use as instructed, Disp: 1 each, Rfl: 5    EScitalopram oxalate (LEXAPRO) 10 MG tablet, Take 1 tablet (10 mg total) by mouth once daily., Disp: 30 tablet, Rfl: 0    fluticasone propionate (FLONASE) 50 mcg/actuation nasal spray, 1 spray (50 mcg total) by Each Nostril route once daily. (Patient not taking: Reported on 1/4/2023), Disp: 9.9 mL, Rfl: 3    gabapentin (NEURONTIN) 300 MG capsule, Take 1 capsule (300 mg total) by mouth 3 (three) times daily., Disp: 90 capsule, Rfl: 11    ibuprofen (ADVIL,MOTRIN) 800 MG tablet, Take 1 tablet (800 mg total) by mouth 3 (three) times daily as needed for Pain. (Patient not taking: Reported on 6/7/2021), Disp: 20 tablet, Rfl: 0    insulin aspart U-100 (NOVOLOG) 100 unit/mL (3 mL) InPn pen, Inject 10  Units into the skin 3 (three) times daily with meals., Disp: 15 mL, Rfl: 2    insulin detemir U-100 (LEVEMIR FLEXTOUCH U-100 INSULN) 100 unit/mL (3 mL) InPn pen, Inject 20 Units into the skin every evening., Disp: 18 mL, Rfl: 3    insulin syringe-needle U-100 1 mL 31 gauge x 5/16 Syrg, 1 each by Misc.(Non-Drug; Combo Route) route 4 (four) times daily., Disp: 100 each, Rfl: 11    metFORMIN (GLUCOPHAGE) 1000 MG tablet, Take 1 tablet (1,000 mg total) by mouth 2 (two) times daily with meals. (Patient not taking: Reported on 1/4/2023), Disp: 60 tablet, Rfl: 3    nicotine (NICODERM CQ) 7 mg/24 hr, Place 1 patch onto the skin once daily. (Patient not taking: Reported on 1/3/2022), Disp: 14 patch, Rfl: 0    traMADoL (ULTRAM) 50 mg tablet, Take 1 tablet (50 mg total) by mouth every 12 (twelve) hours as needed for Pain. (Patient not taking: Reported on 6/7/2021), Disp: 10 tablet, Rfl: 0    Review of patient's allergies indicates:   Allergen Reactions    Wasp venom        Past Medical History:   Diagnosis Date    Diabetes mellitus, type 2        History reviewed. No pertinent surgical history.    History reviewed. No pertinent family history.    Social History     Tobacco Use    Smoking status: Every Day     Types: Cigarettes    Smokeless tobacco: Never   Substance Use Topics    Alcohol use: Yes     Comment: occasionally    Drug use: Never       Review of Systems   Constitutional:  Positive for unexpected weight change. Negative for chills, diaphoresis and fever.        Weight loss of 130 lbs in the past 2 years   HENT:  Negative for hearing loss, sore throat and trouble swallowing.    Eyes:  Negative for photophobia, discharge, redness and visual disturbance.   Respiratory:  Negative for cough and shortness of breath.    Cardiovascular:  Negative for chest pain.   Gastrointestinal:  Positive for diarrhea. Negative for abdominal pain, constipation, nausea and vomiting.        Diarrhea every other day   Endocrine: Positive for  polydipsia.   Genitourinary:  Negative for difficulty urinating, dysuria and hematuria.   Musculoskeletal:  Positive for leg pain. Negative for joint deformity.        Bilateral foot and leg pain   Integumentary:  Positive for rash. Negative for wound.        Rash in bottom of feet bilaterally   Neurological:  Positive for weakness and numbness.        Numbness in both legs and feet and inability to walk   Psychiatric/Behavioral:  Negative for self-injury and suicidal ideas.        Current Medications:   Medication List with Changes/Refills   New Medications    BLOOD-GLUCOSE METER KIT    Use as instructed       Start Date: 1/4/2023  End Date: 1/4/2024    GABAPENTIN (NEURONTIN) 300 MG CAPSULE    Take 1 capsule (300 mg total) by mouth 3 (three) times daily.       Start Date: 1/4/2023  End Date: 1/4/2024    INSULIN SYRINGE-NEEDLE U-100 1 ML 31 GAUGE X 5/16 SYRG    1 each by Misc.(Non-Drug; Combo Route) route 4 (four) times daily.       Start Date: 1/4/2023  End Date: --   Current Medications    ESCITALOPRAM OXALATE (LEXAPRO) 10 MG TABLET    Take 1 tablet (10 mg total) by mouth once daily.       Start Date: 1/5/2022  End Date: 2/4/2022    FLUTICASONE PROPIONATE (FLONASE) 50 MCG/ACTUATION NASAL SPRAY    1 spray (50 mcg total) by Each Nostril route once daily.       Start Date: 6/7/2021  End Date: --    IBUPROFEN (ADVIL,MOTRIN) 800 MG TABLET    Take 1 tablet (800 mg total) by mouth 3 (three) times daily as needed for Pain.       Start Date: 4/14/2021 End Date: --    METFORMIN (GLUCOPHAGE) 1000 MG TABLET    Take 1 tablet (1,000 mg total) by mouth 2 (two) times daily with meals.       Start Date: 6/7/2021  End Date: --    NICOTINE (NICODERM CQ) 7 MG/24 HR    Place 1 patch onto the skin once daily.       Start Date: 6/7/2021  End Date: --    TRAMADOL (ULTRAM) 50 MG TABLET    Take 1 tablet (50 mg total) by mouth every 12 (twelve) hours as needed for Pain.       Start Date: 4/14/2021 End Date: --   Changed and/or Refilled  "Medications    Modified Medication Previous Medication    INSULIN ASPART U-100 (NOVOLOG) 100 UNIT/ML (3 ML) INPN PEN insulin aspart U-100 (NOVOLOG) 100 unit/mL (3 mL) InPn pen       Inject 10 Units into the skin 3 (three) times daily with meals.    INJECT 7 UNITS SUB-Q 3 TIMES DAILY BEFORE MEALS       Start Date: 1/4/2023  End Date: --    Start Date: 6/17/2021 End Date: 1/4/2023    INSULIN DETEMIR U-100 (LEVEMIR FLEXTOUCH U-100 INSULN) 100 UNIT/ML (3 ML) INPN PEN insulin detemir U-100 (LEVEMIR FLEXTOUCH U-100 INSULN) 100 unit/mL (3 mL) InPn pen       Inject 20 Units into the skin every evening.    Inject 22 Units into the skin once daily.       Start Date: 1/4/2023  End Date: 1/4/2024    Start Date: 6/17/2021 End Date: 1/4/2023            Objective:        Vitals:    01/04/23 0837   BP: (!) 135/90   BP Location: Left arm   Patient Position: Sitting   BP Method: Medium (Automatic)   Pulse: 109   Resp: 20   Temp: 98.4 °F (36.9 °C)   TempSrc: Oral   SpO2: 97%   Weight: 68.3 kg (150 lb 9.6 oz)   Height: 6' 4" (1.93 m)       Physical Exam  Vitals and nursing note reviewed.   Constitutional:       General: He is not in acute distress.     Appearance: He is ill-appearing. He is not toxic-appearing or diaphoretic.   HENT:      Head: Normocephalic and atraumatic.      Right Ear: External ear normal.      Left Ear: External ear normal.      Nose: Nose normal.      Mouth/Throat:      Pharynx: Oropharynx is clear.   Eyes:      General: No scleral icterus.        Right eye: No discharge.         Left eye: No discharge.      Extraocular Movements: Extraocular movements intact.      Pupils: Pupils are equal, round, and reactive to light.   Cardiovascular:      Rate and Rhythm: Regular rhythm. Tachycardia present.      Pulses: Normal pulses.      Heart sounds: Normal heart sounds. No murmur heard.    No friction rub. No gallop.   Pulmonary:      Effort: Pulmonary effort is normal. No respiratory distress.      Breath sounds: Normal " breath sounds. No wheezing, rhonchi or rales.   Abdominal:      General: Bowel sounds are normal. There is no distension.   Musculoskeletal:         General: Tenderness present. No swelling.      Cervical back: Neck supple.      Right lower leg: No edema.      Left lower leg: No edema.      Comments: Bilateral legs and feet TTP   Skin:     General: Skin is warm and dry.      Findings: Rash present. No lesion.      Comments: Rash in bottom of feet bilaterally without redness, swelling, or signs of infection   Neurological:      General: No focal deficit present.      Mental Status: He is alert and oriented to person, place, and time.      Sensory: No sensory deficit.   Psychiatric:         Mood and Affect: Mood normal.         Behavior: Behavior normal.         Protective Sensation (w/ 10 gram monofilament):  Right: Intact at site 1-10 in diagram  Left: Intact at site 1-10 in diagram    Visual Inspection:  Normal -  Bilateral and Nails Intact - without Evidence of Foot Deformity- Bilateral           Lab Results   Component Value Date    WBC 6.29 01/03/2022    HGB 15.4 01/03/2022    HCT 45.4 01/03/2022     01/03/2022    CHOL 196 01/04/2023    TRIG 129 01/04/2023    HDL 51 01/04/2023    ALT 18 01/04/2023    AST 9 (L) 01/04/2023     (L) 01/04/2023    K 4.0 01/04/2023    CL 97 (L) 01/04/2023    CREATININE 0.93 01/04/2023    BUN 8 01/04/2023    CO2 35 (H) 01/04/2023    TSH 0.764 06/07/2021    HGBA1C 12.4 (H) 01/04/2023    MICROALBUR 0.7 01/04/2023      Assessment:       1. Type 2 diabetes mellitus with diabetic neuropathy, with long-term current use of insulin    2. Other specified diabetes mellitus with other specified complication, with long-term current use of insulin    3. Need for vaccination    4. Noncompliance with diabetes treatment    5. 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 Weight loss, unintentional          Plan:         Problem List Items Addressed This Visit           Endocrine    Type 2 diabetes mellitus with diabetic neuropathy, with long-term current use of insulin - Primary (Chronic)     Noncompliant to medications for about 1 year, last A1c 15.9, glucose 431 on 1/3/22.  Restarted Levemir 20U Qhs, Aspart 10U TIDWM, started Gabapentin 300 TID  C peptide, UA, serum ketone, lipid panel, CMP, A1c, urine microalbumin/creatinine, home blood glucose monitor, insulin syringe needles.   Flu and COVID vaccines.  Refer to diabetes educator, endocrinology.   Recommend to check home glucose BID.  F/u in 2 weeks         Relevant Medications    insulin syringe-needle U-100 1 mL 31 gauge x 5/16 Syrg    insulin detemir U-100 (LEVEMIR FLEXTOUCH U-100 INSULN) 100 unit/mL (3 mL) InPn pen    insulin aspart U-100 (NOVOLOG) 100 unit/mL (3 mL) InPn pen    gabapentin (NEURONTIN) 300 MG capsule    Other Relevant Orders    BLOOD GLUCOSE MONITOR FOR HOME USE    Ambulatory referral/consult to Endocrinology    Acetone, serum (Completed)    Weight loss, unintentional     Weight loss of 65 lbs in the past 2 years (loss of 130 lbs per patient)  Likely 2/2 to poorly controlled diabetes  Continue to monitor on next visit         Noncompliance with diabetes treatment     See T2DM         Relevant Orders    Acetone, serum (Completed)    RESOLVED: 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    Relevant Medications    insulin detemir U-100 (LEVEMIR FLEXTOUCH U-100 INSULN) 100 unit/mL (3 mL) InPn pen    insulin aspart U-100 (NOVOLOG) 100 unit/mL (3 mL) InPn pen    Other Relevant Orders    Acetone, serum (Completed)     Other Visit Diagnoses       Other specified diabetes mellitus with other specified complication, with long-term current use of insulin        Relevant Medications    insulin syringe-needle U-100 1 mL 31 gauge x 5/16 Syrg    insulin detemir U-100 (LEVEMIR FLEXTOUCH U-100 INSULN) 100 unit/mL (3 mL) InPn pen    insulin aspart U-100 (NOVOLOG) 100 unit/mL (3 mL) InPn pen    gabapentin  (NEURONTIN) 300 MG capsule    Other Relevant Orders    Acetone, serum    Ambulatory referral/consult to Diabetes Education    BLOOD GLUCOSE MONITOR FOR HOME USE    Ambulatory referral/consult to Endocrinology    Comprehensive Metabolic Panel (Completed)    C-Peptide (Completed)    Hemoglobin A1C (Completed)    Lipid Panel (Completed)    Microalbumin/Creatinine Ratio, Urine (Completed)    Protein, urine, qualitative (Completed)    Acetone, serum (Completed)    Need for vaccination        Relevant Orders    COVID-19-MRNA-(PF)(Moderna)Vaccine (Completed)    Acetone, serum (Completed)              Follow up in about 2 weeks (around 1/18/2023) for diabetes management f/u, weight loss.    Yazan Lyman DO     Instructed patient that if symptoms fail to improve or worsen patient should seek immediate medical attention or report to the nearest emergency department. Patient expressed verbal agreement and understanding to this plan of care.

## 2023-01-04 NOTE — ASSESSMENT & PLAN NOTE
Noncompliant to medications for about 1 year, last A1c 15.9, glucose 431 on 1/3/22.  Restarted Levemir 20U Qhs, Aspart 10U TIDWM with syringes (10ml), started Gabapentin 300 TID    F/u in 2 weeks

## 2023-01-05 ENCOUNTER — HOSPITAL ENCOUNTER (EMERGENCY)
Facility: HOSPITAL | Age: 28
Discharge: HOME | End: 2023-01-05
Payer: MEDICARE

## 2023-01-05 VITALS
WEIGHT: 150 LBS | TEMPERATURE: 99 F | DIASTOLIC BLOOD PRESSURE: 72 MMHG | SYSTOLIC BLOOD PRESSURE: 123 MMHG | HEIGHT: 76 IN | RESPIRATION RATE: 16 BRPM | BODY MASS INDEX: 18.27 KG/M2 | HEART RATE: 113 BPM | OXYGEN SATURATION: 99 %

## 2023-01-05 DIAGNOSIS — R73.9 HYPERGLYCEMIA: Primary | ICD-10-CM

## 2023-01-05 PROBLEM — E11.40 TYPE 2 DIABETES MELLITUS WITH DIABETIC NEUROPATHY, WITH LONG-TERM CURRENT USE OF INSULIN: Chronic | Status: ACTIVE | Noted: 2021-06-07

## 2023-01-05 PROBLEM — Z79.4 TYPE 2 DIABETES MELLITUS WITH DIABETIC NEUROPATHY, WITH LONG-TERM CURRENT USE OF INSULIN: Chronic | Status: ACTIVE | Noted: 2021-06-07

## 2023-01-05 PROBLEM — E11.40 DIABETES MELLITUS WITH DIABETIC NEUROPATHY: Status: RESOLVED | Noted: 2023-01-05 | Resolved: 2023-01-05

## 2023-01-05 PROBLEM — Z23 NEED FOR VACCINATION: Status: ACTIVE | Noted: 2023-01-05

## 2023-01-05 PROBLEM — E08.21 DIABETES DUE TO UNDERLYING CONDITION W DIABETIC NEPHROPATHY: Status: RESOLVED | Noted: 2023-01-04 | Resolved: 2023-01-05

## 2023-01-05 PROBLEM — E11.40 DIABETES MELLITUS WITH DIABETIC NEUROPATHY: Status: ACTIVE | Noted: 2023-01-05

## 2023-01-05 LAB
ACETONE SERPL QL SCN: NEGATIVE
ANION GAP SERPL CALCULATED.3IONS-SCNC: 8 MMOL/L (ref 7–16)
BASOPHILS # BLD AUTO: 0.03 K/UL (ref 0–0.2)
BASOPHILS NFR BLD AUTO: 0.5 % (ref 0–1)
BILIRUB UR QL STRIP: NEGATIVE
BUN SERPL-MCNC: 9 MG/DL (ref 7–18)
BUN/CREAT SERPL: 10 (ref 6–20)
CALCIUM SERPL-MCNC: 9.6 MG/DL (ref 8.5–10.1)
CHLORIDE SERPL-SCNC: 97 MMOL/L (ref 98–107)
CLARITY UR: CLEAR
CO2 SERPL-SCNC: 33 MMOL/L (ref 21–32)
COLOR UR: COLORLESS
CREAT SERPL-MCNC: 0.93 MG/DL (ref 0.7–1.3)
DIFFERENTIAL METHOD BLD: ABNORMAL
EGFR (NO RACE VARIABLE) (RUSH/TITUS): 116 ML/MIN/1.73M²
EOSINOPHIL # BLD AUTO: 0.02 K/UL (ref 0–0.5)
EOSINOPHIL NFR BLD AUTO: 0.3 % (ref 1–4)
ERYTHROCYTE [DISTWIDTH] IN BLOOD BY AUTOMATED COUNT: 11.9 % (ref 11.5–14.5)
GLUCOSE SERPL-MCNC: 267 MG/DL (ref 70–105)
GLUCOSE SERPL-MCNC: 455 MG/DL (ref 74–106)
GLUCOSE UR STRIP-MCNC: >1000 MG/DL
HCO3 UR-SCNC: 37.4 MMOL/L (ref 24–28)
HCT VFR BLD AUTO: 45 % (ref 40–54)
HCT VFR BLD CALC: 50 % (ref 35–51)
HGB BLD-MCNC: 15.1 G/DL (ref 13.5–18)
IMM GRANULOCYTES # BLD AUTO: 0.01 K/UL (ref 0–0.04)
IMM GRANULOCYTES NFR BLD: 0.2 % (ref 0–0.4)
KETONES UR STRIP-SCNC: 10 MG/DL
LDH SERPL L TO P-CCNC: 1.6 MMOL/L (ref 0.3–1.2)
LEUKOCYTE ESTERASE UR QL STRIP: NEGATIVE
LYMPHOCYTES # BLD AUTO: 1.59 K/UL (ref 1–4.8)
LYMPHOCYTES NFR BLD AUTO: 24 % (ref 27–41)
MCH RBC QN AUTO: 30.1 PG (ref 27–31)
MCHC RBC AUTO-ENTMCNC: 33.6 G/DL (ref 32–36)
MCV RBC AUTO: 89.6 FL (ref 80–96)
MONOCYTES # BLD AUTO: 0.63 K/UL (ref 0–0.8)
MONOCYTES NFR BLD AUTO: 9.5 % (ref 2–6)
MPC BLD CALC-MCNC: 11.5 FL (ref 9.4–12.4)
NEUTROPHILS # BLD AUTO: 4.35 K/UL (ref 1.8–7.7)
NEUTROPHILS NFR BLD AUTO: 65.5 % (ref 53–65)
NITRITE UR QL STRIP: NEGATIVE
NRBC # BLD AUTO: 0 X10E3/UL
NRBC, AUTO (.00): 0 %
PCO2 BLDA: 59 MMHG (ref 41–51)
PH SMN: 7.41 [PH] (ref 7.32–7.42)
PH UR STRIP: 6.5 PH UNITS
PLATELET # BLD AUTO: 252 K/UL (ref 150–400)
PO2 BLDA: 11 MMHG (ref 25–40)
POC BASE EXCESS: 10.1 MMOL/L (ref -2–3)
POC CO2: 39.2 MMOL/L
POC IONIZED CALCIUM: 1.12 MMOL/L (ref 1.15–1.35)
POC SATURATED O2: 12 % (ref 40–70)
POCT GLUCOSE: 384 MG/DL (ref 60–95)
POTASSIUM BLD-SCNC: 4 MMOL/L (ref 3.4–4.5)
POTASSIUM SERPL-SCNC: 4.1 MMOL/L (ref 3.5–5.1)
PROT UR QL STRIP: NEGATIVE
RBC # BLD AUTO: 5.02 M/UL (ref 4.6–6.2)
RBC # UR STRIP: NEGATIVE /UL
SODIUM BLD-SCNC: 133 MMOL/L (ref 136–145)
SODIUM SERPL-SCNC: 134 MMOL/L (ref 136–145)
SP GR UR STRIP: 1.05
UROBILINOGEN UR STRIP-ACNC: NORMAL MG/DL
WBC # BLD AUTO: 6.63 K/UL (ref 4.5–11)

## 2023-01-05 PROCEDURE — 99284 EMERGENCY DEPT VISIT MOD MDM: CPT | Mod: 25

## 2023-01-05 PROCEDURE — 85025 COMPLETE CBC W/AUTO DIFF WBC: CPT | Performed by: NURSE PRACTITIONER

## 2023-01-05 PROCEDURE — 80048 BASIC METABOLIC PNL TOTAL CA: CPT | Performed by: NURSE PRACTITIONER

## 2023-01-05 PROCEDURE — 82330 ASSAY OF CALCIUM: CPT

## 2023-01-05 PROCEDURE — 96374 THER/PROPH/DIAG INJ IV PUSH: CPT

## 2023-01-05 PROCEDURE — 84295 ASSAY OF SERUM SODIUM: CPT

## 2023-01-05 PROCEDURE — 81003 URINALYSIS AUTO W/O SCOPE: CPT | Performed by: NURSE PRACTITIONER

## 2023-01-05 PROCEDURE — 83605 ASSAY OF LACTIC ACID: CPT

## 2023-01-05 PROCEDURE — 82947 ASSAY GLUCOSE BLOOD QUANT: CPT

## 2023-01-05 PROCEDURE — 82803 BLOOD GASES ANY COMBINATION: CPT

## 2023-01-05 PROCEDURE — 96361 HYDRATE IV INFUSION ADD-ON: CPT

## 2023-01-05 PROCEDURE — 36415 COLL VENOUS BLD VENIPUNCTURE: CPT | Performed by: NURSE PRACTITIONER

## 2023-01-05 PROCEDURE — 25000003 PHARM REV CODE 250: Performed by: NURSE PRACTITIONER

## 2023-01-05 PROCEDURE — 99284 PR EMERGENCY DEPT VISIT,LEVEL IV: ICD-10-PCS | Mod: ,,, | Performed by: NURSE PRACTITIONER

## 2023-01-05 PROCEDURE — 63600175 PHARM REV CODE 636 W HCPCS: Mod: GZ | Performed by: NURSE PRACTITIONER

## 2023-01-05 PROCEDURE — 85014 HEMATOCRIT: CPT

## 2023-01-05 PROCEDURE — 99284 EMERGENCY DEPT VISIT MOD MDM: CPT | Mod: ,,, | Performed by: NURSE PRACTITIONER

## 2023-01-05 PROCEDURE — 82962 GLUCOSE BLOOD TEST: CPT

## 2023-01-05 PROCEDURE — 84132 ASSAY OF SERUM POTASSIUM: CPT

## 2023-01-05 PROCEDURE — 82009 KETONE BODYS QUAL: CPT | Performed by: NURSE PRACTITIONER

## 2023-01-05 RX ADMIN — HUMAN INSULIN 10 UNITS: 100 INJECTION, SOLUTION SUBCUTANEOUS at 01:01

## 2023-01-05 RX ADMIN — SODIUM CHLORIDE 1000 ML: 9 INJECTION, SOLUTION INTRAVENOUS at 12:01

## 2023-01-05 NOTE — PROGRESS NOTES
Attempted to call patient multiple times about his glucose at 553 without success. Will retry tomorrow

## 2023-01-05 NOTE — DISCHARGE INSTRUCTIONS
Follow-up with Pcp and take medications as directed. Monitor glucose and return to the ed with any worsening symptoms.

## 2023-01-05 NOTE — PROGRESS NOTES
Results including  discussed with patient and his girlfriend this AM, recommended to go to the ED for concerns of early DKA and weight loss. Patient expressed understanding and went to [facility].

## 2023-01-05 NOTE — ASSESSMENT & PLAN NOTE
Weight loss of 65 lbs in the past 2 years (loss of 130 lbs per patient)  Likely 2/2 to poorly controlled diabetes  Hep C and HIV neg on 6/7/21  Continue to monitor on next visit

## 2023-01-05 NOTE — ASSESSMENT & PLAN NOTE
Noncompliant to medications for about 1 year, last A1c 15.9, glucose 431 on 1/3/22.  Restarted Levemir 20U Qhs, Aspart 10U TIDWM, started Gabapentin 300 TID  C peptide, UA, serum ketone, lipid panel, CMP, A1c, urine microalbumin/creatinine, home blood glucose monitor, insulin syringe needles.   Refer to diabetes educator, endocrinology.   Recommend to check home glucose BID.  F/u in 2 weeks

## 2023-01-05 NOTE — ED PROVIDER NOTES
Encounter Date: 1/5/2023       History     Chief Complaint   Patient presents with    Hyperglycemia     Sent from [facility] for hyperglycemia      27 year old male presents to the emergency department to be evaluated to be evaluated for hyperglycemia. He complains of polydipsia and polyuria over the last several days.     The history is provided by the patient.   Diabetes  This is a chronic problem. Associated symptoms include polydipsia, polyuria and weight loss. Pertinent negatives for diabetes include no blurred vision, no chest pain, no fatigue, no foot paresthesias, no foot ulcerations, no polyphagia, no visual change and no weakness.   Review of patient's allergies indicates:   Allergen Reactions    Wasp venom      Past Medical History:   Diagnosis Date    Diabetes mellitus, type 2      History reviewed. No pertinent surgical history.  History reviewed. No pertinent family history.  Social History     Tobacco Use    Smoking status: Every Day     Types: Cigarettes    Smokeless tobacco: Never   Substance Use Topics    Alcohol use: Yes     Comment: occasionally    Drug use: Yes     Types: Marijuana     Review of Systems   Constitutional:  Positive for weight loss. Negative for fatigue.   Eyes:  Negative for blurred vision.   Cardiovascular:  Negative for chest pain.   Endocrine: Positive for polydipsia and polyuria. Negative for polyphagia.   Neurological:  Negative for weakness.   All other systems reviewed and are negative.    Physical Exam     Initial Vitals [01/05/23 1126]   BP Pulse Resp Temp SpO2   123/72 (!) 113 16 99.4 °F (37.4 °C) 99 %      MAP       --         Physical Exam    Vitals reviewed.  Constitutional: He appears well-developed and well-nourished.   Eyes: EOM are normal. Pupils are equal, round, and reactive to light.   Neck: Neck supple.   Cardiovascular:    Tachycardia present.         Pulmonary/Chest: Breath sounds normal.   Abdominal: Abdomen is soft. Bowel sounds are normal. He exhibits no  distension and no mass. There is no abdominal tenderness. There is no rebound and no guarding.   Musculoskeletal:      Cervical back: Neck supple.     Neurological: He is alert and oriented to person, place, and time. He has normal strength. GCS score is 15. GCS eye subscore is 4. GCS verbal subscore is 5. GCS motor subscore is 6.   Skin: Skin is warm and dry. Capillary refill takes less than 2 seconds.   Psychiatric: He has a normal mood and affect.       Medical Screening Exam   See Full Note    ED Course   Procedures  Labs Reviewed   BASIC METABOLIC PANEL - Abnormal; Notable for the following components:       Result Value    Sodium 134 (*)     Chloride 97 (*)     CO2 33 (*)     Glucose 455 (*)     All other components within normal limits   URINALYSIS - Abnormal; Notable for the following components:    Ketones, UA 10 (*)     Specific Gravity, UA 1.047 (*)     All other components within normal limits   CBC WITH DIFFERENTIAL - Abnormal; Notable for the following components:    Neutrophils % 65.5 (*)     Lymphocytes % 24.0 (*)     Monocytes % 9.5 (*)     Eosinophils % 0.3 (*)     All other components within normal limits   CBC W/ AUTO DIFFERENTIAL    Narrative:     The following orders were created for panel order CBC auto differential.  Procedure                               Abnormality         Status                     ---------                               -----------         ------                     CBC with Differential[[id number]]        Abnormal            Final result                 Please view results for these tests on the individual orders.   ACETONE   POCT GLUCOSE MONITORING CONTINUOUS          Imaging Results    None          Medications   sodium chloride 0.9% bolus 1,000 mL 1,000 mL (0 mLs Intravenous Stopped 1/5/23 1307)   insulin regular injection 10 Units 0.1 mL (10 Units Intravenous Given 1/5/23 1313)     Medical Decision Making:   Clinical Tests:   Lab Tests: Ordered and Reviewed  The  following lab test(s) were unremarkable: CBC, BMP and Urinalysis  ED Management:  Glucose is 267 and pt states he is ready to be discharged. He understands to follow-up with PCP and take medications as directed.                  Clinical Impression:   Final diagnoses:  [R73.9] Hyperglycemia (Primary)        ED Disposition Condition    Discharge Stable          ED Prescriptions    None       Follow-up Information    None          Anitha Fuentes, WANDA  01/05/23 6464

## 2023-01-05 NOTE — PROGRESS NOTES
I have reviewed the notes, assessments, and/or procedures performed by DR. TAYLOR, I concur with his documentation of Low Gutierrez.

## 2023-01-09 LAB — GLUCOSE SERPL-MCNC: 480 MG/DL (ref 70–105)

## 2023-01-18 ENCOUNTER — OFFICE VISIT (OUTPATIENT)
Dept: FAMILY MEDICINE | Facility: CLINIC | Age: 28
End: 2023-01-18
Payer: MEDICARE

## 2023-01-18 VITALS
DIASTOLIC BLOOD PRESSURE: 78 MMHG | WEIGHT: 160 LBS | BODY MASS INDEX: 19.48 KG/M2 | TEMPERATURE: 98 F | HEIGHT: 76 IN | HEART RATE: 114 BPM | RESPIRATION RATE: 20 BRPM | SYSTOLIC BLOOD PRESSURE: 125 MMHG | OXYGEN SATURATION: 97 %

## 2023-01-18 DIAGNOSIS — E13.42 DIABETIC POLYNEUROPATHY ASSOCIATED WITH OTHER SPECIFIED DIABETES MELLITUS: ICD-10-CM

## 2023-01-18 DIAGNOSIS — Z79.4 OTHER SPECIFIED DIABETES MELLITUS WITH OTHER SPECIFIED COMPLICATION, WITH LONG-TERM CURRENT USE OF INSULIN: ICD-10-CM

## 2023-01-18 DIAGNOSIS — E13.69 OTHER SPECIFIED DIABETES MELLITUS WITH OTHER SPECIFIED COMPLICATION, WITH LONG-TERM CURRENT USE OF INSULIN: ICD-10-CM

## 2023-01-18 DIAGNOSIS — N52.9 ERECTILE DYSFUNCTION, UNSPECIFIED ERECTILE DYSFUNCTION TYPE: ICD-10-CM

## 2023-01-18 DIAGNOSIS — R63.4 WEIGHT LOSS, UNINTENTIONAL: ICD-10-CM

## 2023-01-18 DIAGNOSIS — Z79.4 TYPE 2 DIABETES MELLITUS WITH DIABETIC NEUROPATHY, WITH LONG-TERM CURRENT USE OF INSULIN: Primary | ICD-10-CM

## 2023-01-18 DIAGNOSIS — E11.40 TYPE 2 DIABETES MELLITUS WITH DIABETIC NEUROPATHY, WITH LONG-TERM CURRENT USE OF INSULIN: Primary | ICD-10-CM

## 2023-01-18 PROBLEM — E11.42 DIABETIC POLYNEUROPATHY: Chronic | Status: ACTIVE | Noted: 2023-01-18

## 2023-01-18 PROBLEM — E11.42 DIABETIC POLYNEUROPATHY: Status: ACTIVE | Noted: 2023-01-18

## 2023-01-18 PROCEDURE — 99213 OFFICE O/P EST LOW 20 MIN: CPT | Mod: ,,, | Performed by: FAMILY MEDICINE

## 2023-01-18 PROCEDURE — 99213 PR OFFICE/OUTPT VISIT, EST, LEVL III, 20-29 MIN: ICD-10-PCS | Mod: ,,, | Performed by: FAMILY MEDICINE

## 2023-01-18 RX ORDER — SILDENAFIL 100 MG/1
50 TABLET, FILM COATED ORAL DAILY PRN
Qty: 20 TABLET | Refills: 3 | Status: SHIPPED | OUTPATIENT
Start: 2023-01-18 | End: 2024-01-18

## 2023-01-18 RX ORDER — INSULIN DETEMIR 100 [IU]/ML
25 INJECTION, SOLUTION SUBCUTANEOUS NIGHTLY
Qty: 22.5 ML | Refills: 2 | Status: SHIPPED | OUTPATIENT
Start: 2023-01-18 | End: 2023-03-23 | Stop reason: SDUPTHER

## 2023-01-18 RX ORDER — INSULIN ASPART 100 [IU]/ML
12 INJECTION, SOLUTION INTRAVENOUS; SUBCUTANEOUS
Qty: 15 ML | Refills: 2 | Status: SHIPPED | OUTPATIENT
Start: 2023-01-18 | End: 2023-03-23 | Stop reason: SDUPTHER

## 2023-01-18 NOTE — PROGRESS NOTES
Subjective:       Patient ID: Low Gutierrez is a 27 y.o. male.    Chief Complaint: Follow-up and Diabetes    Patient is a 26yo AA male with a PMH of DM, depression, scoliosis, and chronic back pain [facility] and left leg and foot pain. Patient's last A1c was 12.4 and glucose 553 in clinic on 1/4/23. He went sent to the [facility] the next day, and glucose was 480 in the ED and came down to 267 after insulin and he was discharged home. He's been taking Levemir 20 qhs, Aspart 10 tidwm, and Gabapentin 300 tid daily. Patient reports glucose at home has been in the 200s-300s. He reports a 10 lb weight gain in the past 2 weeks. His complaint today are left leg and foot pain, decreased sleep due to pain, and erectile dysfunction since starting the insulin. Patient took mother's Lortab for his LLE pain with some relief but reports Gabapentin and OTC Tylenol or ibuporfen are not helping with his symptom. He denies diarrhea, bloody stool, dysuria, or hematuria. Patient denies CP, SOB, abdominal pain, nausea, vomiting. Patient's walking has improved compared to the last visit.          Current Outpatient Medications:     blood-glucose meter kit, Use as instructed, Disp: 1 each, Rfl: 5    gabapentin (NEURONTIN) 300 MG capsule, Take 1 capsule (300 mg total) by mouth 3 (three) times daily., Disp: 90 capsule, Rfl: 11    insulin syringe-needle U-100 1 mL 31 gauge x 5/16 Syrg, 1 each by Misc.(Non-Drug; Combo Route) route 4 (four) times daily., Disp: 100 each, Rfl: 11    EScitalopram oxalate (LEXAPRO) 10 MG tablet, Take 1 tablet (10 mg total) by mouth once daily., Disp: 30 tablet, Rfl: 0    insulin aspart U-100 (NOVOLOG) 100 unit/mL (3 mL) InPn pen, Inject 12 Units into the skin 3 (three) times daily with meals., Disp: 15 mL, Rfl: 2    insulin detemir U-100 (LEVEMIR FLEXTOUCH U-100 INSULN) 100 unit/mL (3 mL) InPn pen, Inject 25 Units into the skin every evening., Disp: 22.5 mL, Rfl: 2     sildenafiL (VIAGRA) 100 MG tablet, Take 0.5 tablets (50 mg total) by mouth daily as needed for Erectile Dysfunction., Disp: 20 tablet, Rfl: 3    traMADoL (ULTRAM) 50 mg tablet, Take 1 tablet (50 mg total) by mouth every 12 (twelve) hours as needed for Pain. (Patient not taking: Reported on 6/7/2021), Disp: 10 tablet, Rfl: 0    Review of patient's allergies indicates:   Allergen Reactions    Wasp venom        Past Medical History:   Diagnosis Date    Diabetes mellitus, type 2        History reviewed. No pertinent surgical history.    History reviewed. No pertinent family history.    Social History     Tobacco Use    Smoking status: Every Day     Types: Cigarettes    Smokeless tobacco: Never   Substance Use Topics    Alcohol use: Yes     Comment: occasionally    Drug use: Yes     Types: Marijuana       Review of Systems   Constitutional:  Negative for appetite change, chills, diaphoresis and fever.        Weight gain of 10 lbs in the past 2 weeks   HENT:  Negative for hearing loss, sore throat and trouble swallowing.    Eyes:  Negative for photophobia, discharge, redness and visual disturbance.   Respiratory:  Negative for cough and shortness of breath.    Cardiovascular:  Negative for chest pain and leg swelling.   Gastrointestinal:  Negative for abdominal pain, diarrhea, nausea and vomiting.   Endocrine: Positive for polydipsia.   Genitourinary:  Negative for difficulty urinating, dysuria and hematuria.   Musculoskeletal:  Positive for leg pain. Negative for joint deformity.        Left foot and leg pain up to knee   Integumentary:  Positive for rash. Negative for wound.        Rash in bottom of feet bilaterally   Neurological:  Positive for weakness and numbness. Negative for facial asymmetry and speech difficulty.        Numbness in left leg and foot    Psychiatric/Behavioral:  Negative for agitation, behavioral problems, self-injury and suicidal ideas.        Current Medications:   Medication List with  Changes/Refills   New Medications    SILDENAFIL (VIAGRA) 100 MG TABLET    Take 0.5 tablets (50 mg total) by mouth daily as needed for Erectile Dysfunction.       Start Date: 1/18/2023 End Date: 1/18/2024   Current Medications    BLOOD-GLUCOSE METER KIT    Use as instructed       Start Date: 1/4/2023  End Date: 1/4/2024    ESCITALOPRAM OXALATE (LEXAPRO) 10 MG TABLET    Take 1 tablet (10 mg total) by mouth once daily.       Start Date: 1/5/2022  End Date: 2/4/2022    GABAPENTIN (NEURONTIN) 300 MG CAPSULE    Take 1 capsule (300 mg total) by mouth 3 (three) times daily.       Start Date: 1/4/2023  End Date: 1/4/2024    INSULIN SYRINGE-NEEDLE U-100 1 ML 31 GAUGE X 5/16 SYRG    1 each by Misc.(Non-Drug; Combo Route) route 4 (four) times daily.       Start Date: 1/4/2023  End Date: --    TRAMADOL (ULTRAM) 50 MG TABLET    Take 1 tablet (50 mg total) by mouth every 12 (twelve) hours as needed for Pain.       Start Date: 4/14/2021 End Date: --   Changed and/or Refilled Medications    Modified Medication Previous Medication    INSULIN ASPART U-100 (NOVOLOG) 100 UNIT/ML (3 ML) INPN PEN insulin aspart U-100 (NOVOLOG) 100 unit/mL (3 mL) InPn pen       Inject 12 Units into the skin 3 (three) times daily with meals.    Inject 10 Units into the skin 3 (three) times daily with meals.       Start Date: 1/18/2023 End Date: --    Start Date: 1/4/2023  End Date: 1/18/2023    INSULIN DETEMIR U-100 (LEVEMIR FLEXTOUCH U-100 INSULN) 100 UNIT/ML (3 ML) INPN PEN insulin detemir U-100 (LEVEMIR FLEXTOUCH U-100 INSULN) 100 unit/mL (3 mL) InPn pen       Inject 25 Units into the skin every evening.    Inject 20 Units into the skin every evening.       Start Date: 1/18/2023 End Date: 10/15/2023    Start Date: 1/4/2023  End Date: 1/18/2023   Discontinued Medications    FLUTICASONE PROPIONATE (FLONASE) 50 MCG/ACTUATION NASAL SPRAY    1 spray (50 mcg total) by Each Nostril route once daily.       Start Date: 6/7/2021  End Date: 1/18/2023    IBUPROFEN  "(ADVIL,MOTRIN) 800 MG TABLET    Take 1 tablet (800 mg total) by mouth 3 (three) times daily as needed for Pain.       Start Date: 4/14/2021 End Date: 1/18/2023    METFORMIN (GLUCOPHAGE) 1000 MG TABLET    Take 1 tablet (1,000 mg total) by mouth 2 (two) times daily with meals.       Start Date: 6/7/2021  End Date: 1/18/2023    NICOTINE (NICODERM CQ) 7 MG/24 HR    Place 1 patch onto the skin once daily.       Start Date: 6/7/2021  End Date: 1/18/2023            Objective:        Vitals:    01/18/23 0828   BP: 125/78   BP Location: Left arm   Patient Position: Sitting   BP Method: Medium (Automatic)   Pulse: (!) 114   Resp: 20   Temp: 97.7 °F (36.5 °C)   TempSrc: Oral   SpO2: 97%   Weight: 72.6 kg (160 lb)   Height: 6' 4" (1.93 m)       Physical Exam  Vitals and nursing note reviewed.   Constitutional:       General: He is not in acute distress.     Appearance: Normal appearance. He is not ill-appearing, toxic-appearing or diaphoretic.   HENT:      Head: Normocephalic and atraumatic.      Right Ear: External ear normal.      Left Ear: External ear normal.      Nose: Nose normal.      Mouth/Throat:      Pharynx: Oropharynx is clear.   Eyes:      General: No scleral icterus.        Right eye: No discharge.         Left eye: No discharge.      Extraocular Movements: Extraocular movements intact.      Pupils: Pupils are equal, round, and reactive to light.   Cardiovascular:      Rate and Rhythm: Regular rhythm. Tachycardia present.      Pulses: Normal pulses.      Heart sounds: Normal heart sounds. No murmur heard.    No friction rub. No gallop.   Pulmonary:      Effort: Pulmonary effort is normal. No respiratory distress.      Breath sounds: Normal breath sounds. No wheezing, rhonchi or rales.   Abdominal:      General: Bowel sounds are normal. There is no distension.   Musculoskeletal:         General: Tenderness present. No swelling.      Cervical back: Neck supple.      Right lower leg: No edema.      Left lower leg: No " edema.      Comments: left leg and foot TTP.   Right hand contracted and small at baseline since birth.   Skin:     General: Skin is warm and dry.      Findings: No lesion or rash.      Comments: Rash in bottom of feet bilaterally without redness, swelling, or signs of infection   Neurological:      General: No focal deficit present.      Mental Status: He is alert and oriented to person, place, and time.      Sensory: No sensory deficit.      Gait: Gait normal.   Psychiatric:         Mood and Affect: Mood normal.         Behavior: Behavior normal.             Lab Results   Component Value Date    WBC 6.63 01/05/2023    HGB 15.1 01/05/2023    HCT 50 01/05/2023     01/05/2023    CHOL 196 01/04/2023    TRIG 129 01/04/2023    HDL 51 01/04/2023    ALT 18 01/04/2023    AST 9 (L) 01/04/2023     (L) 01/05/2023    K 4.1 01/05/2023    CL 97 (L) 01/05/2023    CREATININE 0.93 01/05/2023    BUN 9 01/05/2023    CO2 33 (H) 01/05/2023    TSH 0.764 06/07/2021    HGBA1C 12.4 (H) 01/04/2023    MICROALBUR 0.7 01/04/2023      Assessment:       1. Type 2 diabetes mellitus with diabetic neuropathy, with long-term current use of insulin    2. Other specified diabetes mellitus with other specified complication, with long-term current use of insulin    3. Erectile dysfunction, unspecified erectile dysfunction type    4. Diabetic polyneuropathy associated with other specified diabetes mellitus    5. Weight loss, unintentional          Plan:         Problem List Items Addressed This Visit          Neuro    Diabetic polyneuropathy (Chronic)     See T2DM         Relevant Medications    insulin aspart U-100 (NOVOLOG) 100 unit/mL (3 mL) InPn pen    insulin detemir U-100 (LEVEMIR FLEXTOUCH U-100 INSULN) 100 unit/mL (3 mL) InPn pen    Other Relevant Orders    Ambulatory referral/consult to Pain Clinic       Renal/    Erectile dysfunction (Chronic)     See T2DM.         Relevant Medications    sildenafiL (VIAGRA) 100 MG tablet        Endocrine    Type 2 diabetes mellitus with diabetic neuropathy, with long-term current use of insulin - Primary (Chronic)     Last A1c 12.4, glucose 553 in clinic (1/4/23). CBC and BMP WNL except glucose. UA ketones 10, serum acetone neg. microalbumin/cr ratio neg, lipid panel WNL. C-peptide 0.68.  Patient went to ED on 1/5/23 and glucose was 480, given insulin then came down to 267. Home glucose checks have been in 200s-300s. Patient has weight gain of 10 lbs in the past 2 weeks. Main concern today is LLE pain and ED.  Increase Neurontin to 600 TID, Levemir 25 Qhs, Aspart 12 TIDWM. Continue home glucose check BID. Start on Viagra 50mg 30min before intercourse. Patient was advised that if erection lasts longer than 4 hours, he needs to head to the ED. He expressed understanding.   Pain management referral sent for diabetic neuropathy.  F/u in 2 weeks.         Relevant Medications    insulin aspart U-100 (NOVOLOG) 100 unit/mL (3 mL) InPn pen    insulin detemir U-100 (LEVEMIR FLEXTOUCH U-100 INSULN) 100 unit/mL (3 mL) InPn pen    Other Relevant Orders    Ambulatory referral/consult to Pain Clinic    Weight loss, unintentional     Weight gain of 10 lbs in the past 2 weeks. See T2DM.  Continue to monitor next visit.          Other Visit Diagnoses       Other specified diabetes mellitus with other specified complication, with long-term current use of insulin        Relevant Medications    insulin aspart U-100 (NOVOLOG) 100 unit/mL (3 mL) InPn pen    insulin detemir U-100 (LEVEMIR FLEXTOUCH U-100 INSULN) 100 unit/mL (3 mL) InPn pen              Follow up in about 2 weeks (around 2/1/2023).    Yazan Lyman DO     Instructed patient that if symptoms fail to improve or worsen patient should seek immediate medical attention or report to the nearest emergency department. Patient expressed verbal agreement and understanding to this plan of care.

## 2023-01-18 NOTE — ASSESSMENT & PLAN NOTE
Last A1c 12.4, glucose 553 in clinic (1/4/23). CBC and BMP WNL except glucose. UA ketones 10, serum acetone neg. microalbumin/cr ratio neg, lipid panel WNL. C-peptide 0.68.  Patient went to ED on 1/5/23 and glucose was 480, given insulin then came down to 267. Home glucose checks have been in 200s-300s. Patient has weight gain of 10 lbs in the past 2 weeks. Main concern today is LLE pain and ED.  Increase Neurontin to 600 TID, Levemir 25 Qhs, Aspart 12 TIDWM. Continue home glucose check BID. Start on Viagra 50mg 30min before intercourse. Patient was advised that if erection lasts longer than 4 hours, he needs to head to the ED. He expressed understanding.   Pain management referral sent for diabetic neuropathy.  F/u in 2 weeks.

## 2023-01-18 NOTE — PROGRESS NOTES
I have reviewed the notes, assessments, and/or procedures performed by , I concur with his documentation of Low Gutierrez.

## 2023-01-25 DIAGNOSIS — E11.40 TYPE 2 DIABETES MELLITUS WITH DIABETIC NEUROPATHY, WITH LONG-TERM CURRENT USE OF INSULIN: ICD-10-CM

## 2023-01-25 DIAGNOSIS — Z79.4 OTHER SPECIFIED DIABETES MELLITUS WITH OTHER SPECIFIED COMPLICATION, WITH LONG-TERM CURRENT USE OF INSULIN: ICD-10-CM

## 2023-01-25 DIAGNOSIS — E13.69 OTHER SPECIFIED DIABETES MELLITUS WITH OTHER SPECIFIED COMPLICATION, WITH LONG-TERM CURRENT USE OF INSULIN: ICD-10-CM

## 2023-01-25 DIAGNOSIS — Z79.4 TYPE 2 DIABETES MELLITUS WITH DIABETIC NEUROPATHY, WITH LONG-TERM CURRENT USE OF INSULIN: ICD-10-CM

## 2023-01-25 RX ORDER — GABAPENTIN 300 MG/1
600 CAPSULE ORAL 3 TIMES DAILY
Qty: 180 CAPSULE | Refills: 11 | Status: SHIPPED | OUTPATIENT
Start: 2023-01-25 | End: 2023-03-14

## 2023-02-09 DIAGNOSIS — Z71.89 COMPLEX CARE COORDINATION: ICD-10-CM

## 2023-02-23 ENCOUNTER — HOSPITAL ENCOUNTER (EMERGENCY)
Facility: HOSPITAL | Age: 28
Discharge: HOME | End: 2023-02-23
Attending: EMERGENCY MEDICINE
Payer: MEDICARE

## 2023-02-23 VITALS
BODY MASS INDEX: 21.14 KG/M2 | HEART RATE: 116 BPM | HEIGHT: 75 IN | TEMPERATURE: 98 F | SYSTOLIC BLOOD PRESSURE: 140 MMHG | OXYGEN SATURATION: 100 % | WEIGHT: 170 LBS | RESPIRATION RATE: 18 BRPM | DIASTOLIC BLOOD PRESSURE: 93 MMHG

## 2023-02-23 DIAGNOSIS — M79.605 LEFT LEG PAIN: ICD-10-CM

## 2023-02-23 DIAGNOSIS — E11.40 NEUROPATHY DUE TO TYPE 2 DIABETES MELLITUS: Primary | ICD-10-CM

## 2023-02-23 DIAGNOSIS — E11.40 TYPE 2 DIABETES MELLITUS WITH DIABETIC NEUROPATHY, WITH LONG-TERM CURRENT USE OF INSULIN: ICD-10-CM

## 2023-02-23 DIAGNOSIS — Z79.4 OTHER SPECIFIED DIABETES MELLITUS WITH OTHER SPECIFIED COMPLICATION, WITH LONG-TERM CURRENT USE OF INSULIN: ICD-10-CM

## 2023-02-23 DIAGNOSIS — F17.210 CIGARETTE NICOTINE DEPENDENCE WITHOUT COMPLICATION: ICD-10-CM

## 2023-02-23 DIAGNOSIS — Z79.4 TYPE 2 DIABETES MELLITUS WITH DIABETIC NEUROPATHY, WITH LONG-TERM CURRENT USE OF INSULIN: ICD-10-CM

## 2023-02-23 DIAGNOSIS — M79.604 RIGHT LEG PAIN: ICD-10-CM

## 2023-02-23 DIAGNOSIS — E13.69 OTHER SPECIFIED DIABETES MELLITUS WITH OTHER SPECIFIED COMPLICATION, WITH LONG-TERM CURRENT USE OF INSULIN: ICD-10-CM

## 2023-02-23 PROCEDURE — 99283 EMERGENCY DEPT VISIT LOW MDM: CPT

## 2023-02-23 PROCEDURE — 99284 EMERGENCY DEPT VISIT MOD MDM: CPT | Mod: EDII,,, | Performed by: EMERGENCY MEDICINE

## 2023-02-23 PROCEDURE — 99284 PR EMERGENCY DEPT VISIT,LEVEL IV: ICD-10-PCS | Mod: EDII,,, | Performed by: EMERGENCY MEDICINE

## 2023-02-23 RX ORDER — GABAPENTIN 300 MG/1
600 CAPSULE ORAL 4 TIMES DAILY
Qty: 40 CAPSULE | Refills: 0 | Status: SHIPPED | OUTPATIENT
Start: 2023-02-23 | End: 2023-03-14

## 2023-02-23 NOTE — DISCHARGE INSTRUCTIONS
ELEVATE LEGS  STOP SMOKING  INCREASE GABAPENTIN FOR NOW  PCP MAY NEED TO ADJUST DOSE ALSO  MULTIVITAMIN DAILY

## 2023-02-23 NOTE — ED PROVIDER NOTES
Encounter Date: 2/23/2023       History     Chief Complaint   Patient presents with    Leg Pain     Bilateral lower leg pain     PT IS A 27 YR OLD BM WITH BLE PAIN WITH HX SIMILAR DUE TO NEUROPATHY WITH CURRENT TREATMENT NEUROTIN 600 TID WITH LAST DOSE THIS AM AND NOW OUT OF MEDICINE. PT DENIES TRAUMA OR DM ULCER  PT HAS IDDM X 1 YR  PT FOLLOWS WITH [facility] IN [place], [place]   THIS AM    Review of patient's allergies indicates:   Allergen Reactions    Wasp venom      Past Medical History:   Diagnosis Date    Diabetes mellitus, type 2      Past Surgical History:   Procedure Laterality Date    right arm surgery      at birth     History reviewed. No pertinent family history.  Social History     Tobacco Use    Smoking status: Every Day     Types: Cigarettes    Smokeless tobacco: Never   Substance Use Topics    Alcohol use: Yes     Comment: occasionally    Drug use: Yes     Types: Marijuana     Comment: 1 day ago use     Review of Systems   Constitutional: Negative.  Negative for fever.   HENT: Negative.  Negative for sore throat.    Respiratory: Negative.  Negative for shortness of breath.    Cardiovascular: Negative.  Negative for chest pain.   Gastrointestinal:  Negative for nausea.   Endocrine: Negative.    Genitourinary: Negative.  Negative for dysuria.   Musculoskeletal:  Positive for myalgias (BLE). Negative for back pain.   Skin: Negative.  Negative for rash.   Allergic/Immunologic: Negative.    Neurological:  Negative for weakness.        DIABETIC NEUROPATHY   Hematological: Negative.  Does not bruise/bleed easily.   Psychiatric/Behavioral:  Positive for dysphoric mood.      Physical Exam     Initial Vitals [02/23/23 0756]   BP Pulse Resp Temp SpO2   (!) 140/93 (!) 116 18 98.1 °F (36.7 °C) 100 %      MAP       --         Physical Exam    Constitutional: He appears well-developed and well-nourished. He is cooperative. He appears distressed.   HENT:   Head: Normocephalic and atraumatic.   Right Ear:  External ear normal.   Left Ear: External ear normal.   Nose: Nose normal.   Mouth/Throat: Oropharynx is clear and moist.   Eyes: Conjunctivae and EOM are normal. Pupils are equal, round, and reactive to light.   Neck: Trachea normal. Neck supple.   Normal range of motion.  Cardiovascular:  Normal rate, regular rhythm, normal heart sounds and intact distal pulses.     Exam reveals no gallop and no friction rub.       No murmur heard.  Pulses:       Radial pulses are 3+ on the right side and 3+ on the left side.        Dorsalis pedis pulses are 3+ on the right side and 3+ on the left side.   Pulmonary/Chest: Breath sounds normal. No respiratory distress. He has no wheezes. He has no rales.   Abdominal: Abdomen is soft. Bowel sounds are normal. He exhibits no distension. There is no abdominal tenderness. There is no rebound.   Musculoskeletal:         General: No tenderness or edema. Normal range of motion.      Right shoulder: Normal.      Left shoulder: Normal.      Right upper arm: Normal.      Left upper arm: Normal.      Right elbow: Normal.      Left elbow: Normal.      Right forearm: Normal.      Left forearm: Normal.      Right wrist: Normal.      Left wrist: Normal.      Right hand: Normal.      Left hand: Normal.      Cervical back: Normal range of motion and neck supple.      Comments: NORMAL N/V     Lymphadenopathy:     He has no cervical adenopathy.     He has no axillary adenopathy.   Neurological: He is alert and oriented to person, place, and time. He has normal strength and normal reflexes. No cranial nerve deficit or sensory deficit. He displays a negative Romberg sign. GCS score is 15. GCS eye subscore is 4. GCS verbal subscore is 5. GCS motor subscore is 6.   Reflex Scores:       Brachioradialis reflexes are 2+ on the right side and 2+ on the left side.       Patellar reflexes are 2+ on the right side and 2+ on the left side.  Skin: Skin is warm and dry. Capillary refill takes less than 2 seconds.  No rash noted. No erythema.   Psychiatric: His speech is normal and behavior is normal. Cognition and memory are normal.   DYSPHORIC MOOD       Medical Screening Exam   See Full Note    ED Course   Procedures  Labs Reviewed - No data to display       Imaging Results    None          Medications - No data to display  Medical Decision Making:   Initial Assessment:   PT IS A 27 YR OLD BM WITH BLE PAIN WITH HX SIMILAR DUE TO NEUROPATHY WITH CURRENT TREATMENT NEUROTIN 600 TID WITH LAST DOSE THIS AM AND NOW OUT OF MEDICINE. PT DENIES TRAUMA OR DM ULCER  PT HAS IDDM X 1 YR  PT FOLLOWS WITH [facility] CL[facility]IC IN [place], [place]  Differential Diagnosis:   DM NEUROPATHY  NICOTINE ADDICTION  MEDICATION NONCOMPLIANCE  ED Management:  PT IS STABLE FOR DISCHARGE  ADVISED TO STOP SMOKING  WILL INCREASE NEUROTIN DOSAGE  ELEVATE LEGS  STOP SMOKING  INCREASE GABAPENTIN FOR NOW  PCP MAY NEED TO ADJUST DOSE ALSO  MULTIVITAMIN DAILY                 Clinical Impression:   Final diagnoses:  [M79.604] Right leg pain  [M79.605] Left leg pain  [E11.40] Neuropathy due to type 2 diabetes mellitus (Primary)  [F17.210] Cigarette nicotine dependence without complication        ED Disposition Condition    Discharge Stable          ED Prescriptions       Medication Sig Dispense Start Date End Date Auth. Provider    gabapentin (NEURONTIN) 300 MG capsule Take 2 capsules (600 mg total) by mouth 4 (four) times daily. for 10 days 40 capsule 2/23/2023 3/5/2023 Janet Champagne MD          Follow-up Information       Follow up With Specialties Details Why Contact Info    Yazan Lyman DO Family Medicine In 1 week  [address] [facility] [place] [address]  [phone]               Janet Champagne MD  02/23/23 5857

## 2023-02-27 ENCOUNTER — OFFICE VISIT (OUTPATIENT)
Dept: PAIN MEDICINE | Facility: CLINIC | Age: 28
End: 2023-02-27
Payer: MEDICARE

## 2023-02-27 VITALS
SYSTOLIC BLOOD PRESSURE: 143 MMHG | BODY MASS INDEX: 19.02 KG/M2 | HEART RATE: 117 BPM | WEIGHT: 153 LBS | TEMPERATURE: 99 F | HEIGHT: 75 IN | RESPIRATION RATE: 18 BRPM | DIASTOLIC BLOOD PRESSURE: 72 MMHG

## 2023-02-27 DIAGNOSIS — Z79.4 TYPE 2 DIABETES MELLITUS WITH DIABETIC NEUROPATHY, WITH LONG-TERM CURRENT USE OF INSULIN: ICD-10-CM

## 2023-02-27 DIAGNOSIS — E13.42 DIABETIC POLYNEUROPATHY ASSOCIATED WITH OTHER SPECIFIED DIABETES MELLITUS: ICD-10-CM

## 2023-02-27 DIAGNOSIS — E11.40 TYPE 2 DIABETES MELLITUS WITH DIABETIC NEUROPATHY, WITH LONG-TERM CURRENT USE OF INSULIN: ICD-10-CM

## 2023-02-27 DIAGNOSIS — Z79.899 ENCOUNTER FOR LONG-TERM (CURRENT) USE OF OTHER MEDICATIONS: Primary | ICD-10-CM

## 2023-02-27 LAB

## 2023-02-27 PROCEDURE — 99203 OFFICE O/P NEW LOW 30 MIN: CPT | Mod: S$PBB,,, | Performed by: PAIN MEDICINE

## 2023-02-27 PROCEDURE — 80305 DRUG TEST PRSMV DIR OPT OBS: CPT | Mod: PBBFAC | Performed by: PAIN MEDICINE

## 2023-02-27 PROCEDURE — 99214 OFFICE O/P EST MOD 30 MIN: CPT | Mod: PBBFAC | Performed by: PAIN MEDICINE

## 2023-02-27 PROCEDURE — 99203 PR OFFICE/OUTPT VISIT, NEW, LEVL III, 30-44 MIN: ICD-10-PCS | Mod: S$PBB,,, | Performed by: PAIN MEDICINE

## 2023-02-27 RX ORDER — PREGABALIN 100 MG/1
100 CAPSULE ORAL 2 TIMES DAILY
Qty: 60 CAPSULE | Refills: 6 | Status: SHIPPED | OUTPATIENT
Start: 2023-02-27 | End: 2023-08-28

## 2023-02-27 NOTE — PROGRESS NOTES
Chronic Pain - New Consult    Referring Physician: Yazan Lyman DO       SUBJECTIVE: Disclaimer: This note has been generated using voice-recognition software. There may be typographical errors that have been missed during proof-reading      Initial encounter:    Low Gutierrez presents to the clinic for the evaluation of diabetic lower extremity pain pain.       27-year-old male presents for new patient evaluation and consultation from Dr. Lyman.  Patient has a 2 year history of diabetes mellitus with poorly controlled glucose due to noncompliance and discontinuation of insulin.  He developed severe neuropathy of the bilateral lower extremities 6-7 months ago, with progressive worsening.  The pain is so severe that he is unable to rest, sit or stand for any prolonged periods of time.  The pain starts in the feet and radiates to the sacrum.  He notes associated paresthesia but denies any lower extremity weakness,  bowel or bladder involvement.  He admits to smoking THC for pain. Gabapentin 900 mg q.i.d. caused drowsiness but without any significant relief.  Tylenol, ibuprofen Ultram also failed to provide relief.    Pain Assessment  Pain Score: 10-Worst pain ever  Pain Location: Leg  Pain Orientation: Right, Left  Pain Descriptors: Sharp, Shooting, Stabbing  Pain Frequency: Continuous  Pain Onset: On-going  Clinical Progression: Gradually worsening  Aggravating Factors: Walking, Standing      Physical Therapy/Home Exercise: no        Pain Medications:  has a current medication list which includes the following prescription(s): blood-glucose meter, gabapentin, insulin aspart u-100, levemir flextouch u-100 insuln, insulin syringe-needle u-100, sildenafil, escitalopram oxalate, gabapentin, pregabalin, and tramadol, and the following Facility-Administered Medications: capsaicin-skin cleanser.      Tried in Past:  NSAIDS-yes  TCA-no  SNRI-no  Anti-convulsants-yes  Muscle  "Relaxants-yes  Opioids-yes  Benzodiazepines-no     4A"s of Opioid Risk Assessment  Activity: Patient is unable to perform  ADL  Analgesia:  Patient's pain is partially controlled by current medication.   Aberrant Behavior:  reviewed with no aberrant drug seeking/taking behavior     report:  Reviewed and consistent with medication use as prescribed.    Patient denies suicidal or homicidal ideations    Pain interventional therapy-no    Chiropractor -no  Acupuncture - no  TENS unit -no  Spinal decompression -no  Joint replacement -no     Review of Systems   Constitutional: Negative.    HENT: Negative.     Eyes: Negative.    Respiratory: Negative.     Cardiovascular: Negative.    Gastrointestinal: Negative.    Endocrine: Negative.    Genitourinary: Negative.    Musculoskeletal:  Positive for gait problem.   Integumentary:  Negative.   Allergic/Immunologic: Negative.    Neurological:  Positive for numbness (Bilateral lower extremities).   Hematological: Negative.    Psychiatric/Behavioral:  Positive for sleep disturbance.            X-Ray Spine Scoliosis 1 View_Supine or Erect  Narrative: EXAMINATION:  XR SPINE SCOLIOSIS 1 VIEW_SUPINE OR ERECT    CLINICAL HISTORY:  Scoliosis, unspecified    COMPARISON:  None.    FINDINGS:  Upright views were obtained.  T1 through T3 were not visualized.  15 degrees of dextroscoliosis is present in the upper thoracic spine with the apex at T6.  There is 6 degrees of compensatory levoscoliosis in the lower thoracic spine the apex at T11.  No focal congenital lesions are seen.  Impression: There is 15 degrees of dextroscoliosis in the upper thoracic spine and 6 degrees of levoscoliosis in the lower thoracic spine.    Place of service: [facility]    Electronically signed by: Irina Lane  Date:    01/03/2022  Time:    10:56         Past Medical History:   Diagnosis Date    Diabetes mellitus, type 2      Past Surgical History:   Procedure Laterality Date    right arm " "surgery      at birth     Social History     Socioeconomic History    Marital status: Single   Tobacco Use    Smoking status: Every Day     Types: Cigarettes    Smokeless tobacco: Never   Substance and Sexual Activity    Alcohol use: Yes     Comment: occasionally    Drug use: Yes     Types: Marijuana     Comment: 1 day ago use    Sexual activity: Yes     No family history on file.  Review of patient's allergies indicates:   Allergen Reactions    Wasp venom          OBJECTIVE:  Vitals:    02/27/23 1251   BP: (!) 143/72   Pulse: (!) 117   Resp: 18   Temp: 98.5 °F (36.9 °C)     BP (!) 143/72 (BP Location: Left arm, Patient Position: Sitting, BP Method: Large (Automatic))   Pulse (!) 117   Temp 98.5 °F (36.9 °C) (Oral)   Resp 18   Ht 6' 3" (1.905 m)   Wt 69.4 kg (153 lb)   BMI 19.12 kg/m²   Physical Exam  Vitals and nursing note reviewed.   Constitutional:       General: He is not in acute distress.     Appearance: Normal appearance. He is underweight. He is not ill-appearing, toxic-appearing or diaphoretic.   HENT:      Head: Normocephalic and atraumatic.      Nose: Nose normal.      Mouth/Throat:      Mouth: Mucous membranes are moist.   Eyes:      Extraocular Movements: Extraocular movements intact.      Pupils: Pupils are equal, round, and reactive to light.   Cardiovascular:      Rate and Rhythm: Normal rate and regular rhythm.      Heart sounds: Normal heart sounds.   Pulmonary:      Effort: Pulmonary effort is normal. No respiratory distress.      Breath sounds: Normal breath sounds. No stridor. No wheezing or rhonchi.   Abdominal:      General: Bowel sounds are normal.      Palpations: Abdomen is soft.   Musculoskeletal:         General: No swelling. Normal range of motion.      Right upper arm: Deformity present.      Cervical back: Normal and normal range of motion. No spasms or tenderness. No pain with movement. Normal range of motion.      Thoracic back: Normal.      Lumbar back: No spasms, tenderness " or bony tenderness. Normal range of motion. Negative right straight leg raise test and negative left straight leg raise test. No scoliosis.      Right lower leg: Tenderness present. No edema.      Left lower leg: No edema.   Skin:     General: Skin is warm.   Neurological:      General: No focal deficit present.      Mental Status: He is alert and oriented to person, place, and time. Mental status is at baseline.      Cranial Nerves: No cranial nerve deficit.      Sensory: Sensation is intact. No sensory deficit.      Motor: No weakness.      Coordination: Coordination normal.      Gait: Gait normal.      Deep Tendon Reflexes: Reflexes are normal and symmetric.   Psychiatric:         Mood and Affect: Mood normal.         Behavior: Behavior normal.          ASSESSMENT: 27 y.o. year old male with pain, consistent with     Encounter Diagnoses   Name Primary?    Type 2 diabetes mellitus with diabetic neuropathy, with long-term current use of insulin     Diabetic polyneuropathy associated with other specified diabetes mellitus     Encounter for long-term (current) use of other medications Yes        PLAN:   1. reviewed  2..Addiction, Dependency, Tolerance, Opioid abuse-misuse, Death, Diversion Discussed. Overdose reversal drug Naloxone discussed  3. Opioid contract signed today  4.Refill/ Continue medications for pain control and function.  We will attempt to obtain Qtenza for bilateral lower extremity diabetic neuropathy       Requested Prescriptions     Signed Prescriptions Disp Refills    pregabalin (LYRICA) 100 MG capsule 60 capsule 6     Sig: Take 1 capsule (100 mg total) by mouth 2 (two) times daily.     5. Urine drug screen and confirmation testing was ordered as documented on the requisition form in order to verify medication compliance, test for illicit substances.    Orders Placed This Encounter   Procedures    Drug Screen Definitive 14, Urine     Standing Status:   Future     Number of Occurrences:   1      Standing Expiration Date:   4/27/2024     Order Specific Question:   Specimen Source     Answer:   Urine    POCT Urine Drug Screen ([facility])     Interpretive Information:     Negative:  No drug detected at the cut off level.   Positive:  This result represents presumptive positive for the   tested drug, other substances may yield a positive response other   than the analyte of interest. This result should be utilized for   diagnostic purpose only. Confirmation testing will be performed upon physician request only.           6. Patient informed to discontinue any THC, if he desires opioid management from our clinic  7.Follow with KALINA Willett in 2-3 weeks for re-evaluation and medication refill      The total time spent for evaluation and management on 02/27/2023 including reviewing separately obtained history, performing a medically appropriate exam and evaluation, documenting clinical information in the health record, independently interpreting results and communicating them to the patient/family/caregiver, and ordering medications/tests/procedures was between 15-29 minutes.    The above plan and management options were discussed at length with patient. Patient is in agreement with the above and verbalized understanding. It will be communicated with the referring physician via electronic record, fax, or mail.    Elizabeth Luevano  02/27/2023

## 2023-03-09 LAB — BEAKER SEE SCANNED REPORT: NORMAL

## 2023-03-14 PROBLEM — M54.50 LOW BACK PAIN: Chronic | Status: ACTIVE | Noted: 2022-01-03

## 2023-03-14 PROBLEM — M41.9 SCOLIOSIS: Chronic | Status: ACTIVE | Noted: 2022-01-03

## 2023-03-14 PROBLEM — M54.50 LOW BACK PAIN: Status: ACTIVE | Noted: 2022-01-03

## 2023-03-14 PROBLEM — M54.9 BACK PAIN: Chronic | Status: ACTIVE | Noted: 2022-01-03

## 2023-03-23 ENCOUNTER — OFFICE VISIT (OUTPATIENT)
Dept: FAMILY MEDICINE | Facility: CLINIC | Age: 28
End: 2023-03-23
Payer: MEDICARE

## 2023-03-23 ENCOUNTER — APPOINTMENT (OUTPATIENT)
Dept: RADIOLOGY | Facility: CLINIC | Age: 28
End: 2023-03-23
Attending: INTERNAL MEDICINE
Payer: MEDICARE

## 2023-03-23 VITALS
OXYGEN SATURATION: 97 % | TEMPERATURE: 97 F | SYSTOLIC BLOOD PRESSURE: 122 MMHG | HEIGHT: 75 IN | RESPIRATION RATE: 18 BRPM | BODY MASS INDEX: 18.11 KG/M2 | DIASTOLIC BLOOD PRESSURE: 85 MMHG | HEART RATE: 110 BPM | WEIGHT: 145.63 LBS

## 2023-03-23 DIAGNOSIS — E03.8 TSH DEFICIENCY: ICD-10-CM

## 2023-03-23 DIAGNOSIS — E11.40 TYPE 2 DIABETES MELLITUS WITH DIABETIC NEUROPATHY, WITH LONG-TERM CURRENT USE OF INSULIN: ICD-10-CM

## 2023-03-23 DIAGNOSIS — Z79.4 OTHER SPECIFIED DIABETES MELLITUS WITH OTHER SPECIFIED COMPLICATION, WITH LONG-TERM CURRENT USE OF INSULIN: ICD-10-CM

## 2023-03-23 DIAGNOSIS — E13.69 OTHER SPECIFIED DIABETES MELLITUS WITH OTHER SPECIFIED COMPLICATION, WITH LONG-TERM CURRENT USE OF INSULIN: ICD-10-CM

## 2023-03-23 DIAGNOSIS — K59.00 CONSTIPATION, UNSPECIFIED CONSTIPATION TYPE: Primary | ICD-10-CM

## 2023-03-23 DIAGNOSIS — N52.9 ERECTILE DYSFUNCTION, UNSPECIFIED ERECTILE DYSFUNCTION TYPE: ICD-10-CM

## 2023-03-23 DIAGNOSIS — K59.00 CONSTIPATION, UNSPECIFIED CONSTIPATION TYPE: ICD-10-CM

## 2023-03-23 DIAGNOSIS — Z11.4 SCREENING FOR HIV (HUMAN IMMUNODEFICIENCY VIRUS): ICD-10-CM

## 2023-03-23 DIAGNOSIS — E11.49 OTHER DIABETIC NEUROLOGICAL COMPLICATION ASSOCIATED WITH TYPE 2 DIABETES MELLITUS: ICD-10-CM

## 2023-03-23 DIAGNOSIS — Z79.4 TYPE 2 DIABETES MELLITUS WITH DIABETIC NEUROPATHY, WITH LONG-TERM CURRENT USE OF INSULIN: ICD-10-CM

## 2023-03-23 LAB
ALBUMIN SERPL BCP-MCNC: 4 G/DL (ref 3.5–5)
ALP SERPL-CCNC: 97 U/L (ref 45–115)
ALT SERPL W P-5'-P-CCNC: 13 U/L (ref 16–61)
ANION GAP SERPL CALCULATED.3IONS-SCNC: 8 MMOL/L (ref 7–16)
AST SERPL W P-5'-P-CCNC: 5 U/L (ref 15–37)
BASOPHILS # BLD AUTO: 0.04 K/UL (ref 0–0.2)
BASOPHILS NFR BLD AUTO: 0.6 % (ref 0–1)
BILIRUB DIRECT SERPL-MCNC: 0.1 MG/DL (ref 0–0.2)
BILIRUB SERPL-MCNC: 0.6 MG/DL (ref ?–1.2)
BUN SERPL-MCNC: 11 MG/DL (ref 7–18)
BUN/CREAT SERPL: 15 (ref 6–20)
CALCIUM SERPL-MCNC: 9.5 MG/DL (ref 8.5–10.1)
CHLORIDE SERPL-SCNC: 99 MMOL/L (ref 98–107)
CO2 SERPL-SCNC: 33 MMOL/L (ref 21–32)
CREAT SERPL-MCNC: 0.72 MG/DL (ref 0.7–1.3)
CREAT UR-MCNC: 88 MG/DL (ref 39–259)
DIFFERENTIAL METHOD BLD: ABNORMAL
EGFR (NO RACE VARIABLE) (RUSH/TITUS): 129 ML/MIN/1.73M²
EOSINOPHIL # BLD AUTO: 0.01 K/UL (ref 0–0.5)
EOSINOPHIL NFR BLD AUTO: 0.2 % (ref 1–4)
ERYTHROCYTE [DISTWIDTH] IN BLOOD BY AUTOMATED COUNT: 11.6 % (ref 11.5–14.5)
EST. AVERAGE GLUCOSE BLD GHB EST-MCNC: 334 MG/DL
GLUCOSE SERPL-MCNC: 406 MG/DL (ref 74–106)
HBA1C MFR BLD HPLC: 12.6 % (ref 4.5–6.6)
HCT VFR BLD AUTO: 43.5 % (ref 40–54)
HGB BLD-MCNC: 14.7 G/DL (ref 13.5–18)
HIV 1+O+2 AB SERPL QL: NORMAL
IMM GRANULOCYTES # BLD AUTO: 0.01 K/UL (ref 0–0.04)
IMM GRANULOCYTES NFR BLD: 0.2 % (ref 0–0.4)
LYMPHOCYTES # BLD AUTO: 2.04 K/UL (ref 1–4.8)
LYMPHOCYTES NFR BLD AUTO: 32.9 % (ref 27–41)
MCH RBC QN AUTO: 29.7 PG (ref 27–31)
MCHC RBC AUTO-ENTMCNC: 33.8 G/DL (ref 32–36)
MCV RBC AUTO: 87.9 FL (ref 80–96)
MICROALBUMIN UR-MCNC: 2 MG/DL (ref 0–2.8)
MICROALBUMIN/CREAT RATIO PNL UR: 22.7 MG/G (ref 0–30)
MONOCYTES # BLD AUTO: 0.41 K/UL (ref 0–0.8)
MONOCYTES NFR BLD AUTO: 6.6 % (ref 2–6)
MPC BLD CALC-MCNC: 12.3 FL (ref 9.4–12.4)
NEUTROPHILS # BLD AUTO: 3.7 K/UL (ref 1.8–7.7)
NEUTROPHILS NFR BLD AUTO: 59.5 % (ref 53–65)
NRBC # BLD AUTO: 0 X10E3/UL
NRBC, AUTO (.00): 0 %
PLATELET # BLD AUTO: 279 K/UL (ref 150–400)
POTASSIUM SERPL-SCNC: 4.2 MMOL/L (ref 3.5–5.1)
PROT SERPL-MCNC: 7.3 G/DL (ref 6.4–8.2)
PROT UR-MCNC: 16.1 MG/DL (ref 0–11.9)
RBC # BLD AUTO: 4.95 M/UL (ref 4.6–6.2)
SODIUM SERPL-SCNC: 136 MMOL/L (ref 136–145)
TSH SERPL DL<=0.005 MIU/L-ACNC: 1.21 UIU/ML (ref 0.36–3.74)
WBC # BLD AUTO: 6.21 K/UL (ref 4.5–11)

## 2023-03-23 PROCEDURE — 80076 HEPATIC FUNCTION PANEL: CPT | Mod: ,,, | Performed by: CLINICAL MEDICAL LABORATORY

## 2023-03-23 PROCEDURE — 85025 COMPLETE CBC W/AUTO DIFF WBC: CPT | Mod: ,,, | Performed by: CLINICAL MEDICAL LABORATORY

## 2023-03-23 PROCEDURE — 84156 PROTEIN, QUANTITATIVE, URINE RANDOM: ICD-10-PCS | Mod: ,,, | Performed by: CLINICAL MEDICAL LABORATORY

## 2023-03-23 PROCEDURE — 84443 TSH: ICD-10-PCS | Mod: ,,, | Performed by: CLINICAL MEDICAL LABORATORY

## 2023-03-23 PROCEDURE — 99214 OFFICE O/P EST MOD 30 MIN: CPT | Mod: ,,, | Performed by: INTERNAL MEDICINE

## 2023-03-23 PROCEDURE — 84156 ASSAY OF PROTEIN URINE: CPT | Mod: ,,, | Performed by: CLINICAL MEDICAL LABORATORY

## 2023-03-23 PROCEDURE — 87389 HIV 1 / 2 ANTIBODY: ICD-10-PCS | Mod: ,,, | Performed by: CLINICAL MEDICAL LABORATORY

## 2023-03-23 PROCEDURE — 82043 UR ALBUMIN QUANTITATIVE: CPT | Mod: ,,, | Performed by: CLINICAL MEDICAL LABORATORY

## 2023-03-23 PROCEDURE — 82570 ASSAY OF URINE CREATININE: CPT | Mod: ,,, | Performed by: CLINICAL MEDICAL LABORATORY

## 2023-03-23 PROCEDURE — 87389 HIV-1 AG W/HIV-1&-2 AB AG IA: CPT | Mod: ,,, | Performed by: CLINICAL MEDICAL LABORATORY

## 2023-03-23 PROCEDURE — 80048 BASIC METABOLIC PNL TOTAL CA: CPT | Mod: ,,, | Performed by: CLINICAL MEDICAL LABORATORY

## 2023-03-23 PROCEDURE — 83036 HEMOGLOBIN A1C: ICD-10-PCS | Mod: ,,, | Performed by: CLINICAL MEDICAL LABORATORY

## 2023-03-23 PROCEDURE — 83036 HEMOGLOBIN GLYCOSYLATED A1C: CPT | Mod: ,,, | Performed by: CLINICAL MEDICAL LABORATORY

## 2023-03-23 PROCEDURE — 82043 MICROALBUMIN / CREATININE RATIO URINE: ICD-10-PCS | Mod: ,,, | Performed by: CLINICAL MEDICAL LABORATORY

## 2023-03-23 PROCEDURE — 82570 MICROALBUMIN / CREATININE RATIO URINE: ICD-10-PCS | Mod: ,,, | Performed by: CLINICAL MEDICAL LABORATORY

## 2023-03-23 PROCEDURE — 99214 PR OFFICE/OUTPT VISIT, EST, LEVL IV, 30-39 MIN: ICD-10-PCS | Mod: ,,, | Performed by: INTERNAL MEDICINE

## 2023-03-23 PROCEDURE — 74018 RADEX ABDOMEN 1 VIEW: CPT | Mod: TC,RHCUB | Performed by: INTERNAL MEDICINE

## 2023-03-23 PROCEDURE — 85025 CBC WITH DIFFERENTIAL: ICD-10-PCS | Mod: ,,, | Performed by: CLINICAL MEDICAL LABORATORY

## 2023-03-23 PROCEDURE — 84443 ASSAY THYROID STIM HORMONE: CPT | Mod: ,,, | Performed by: CLINICAL MEDICAL LABORATORY

## 2023-03-23 PROCEDURE — 80048 BASIC METABOLIC PANEL: ICD-10-PCS | Mod: ,,, | Performed by: CLINICAL MEDICAL LABORATORY

## 2023-03-23 PROCEDURE — 80076 HEPATIC FUNCTION PANEL: ICD-10-PCS | Mod: ,,, | Performed by: CLINICAL MEDICAL LABORATORY

## 2023-03-23 RX ORDER — PREGABALIN 100 MG/1
100 CAPSULE ORAL 2 TIMES DAILY
Qty: 60 CAPSULE | Refills: 6 | Status: CANCELLED | OUTPATIENT
Start: 2023-03-23

## 2023-03-27 ENCOUNTER — TELEPHONE (OUTPATIENT)
Dept: FAMILY MEDICINE | Facility: CLINIC | Age: 28
End: 2023-03-27
Payer: MEDICARE

## 2023-03-27 PROBLEM — Z11.4 SCREENING FOR HIV (HUMAN IMMUNODEFICIENCY VIRUS): Status: ACTIVE | Noted: 2021-06-07

## 2023-03-27 PROBLEM — K59.00 CONSTIPATION: Status: ACTIVE | Noted: 2023-03-27

## 2023-03-27 PROBLEM — E11.9 DIABETES MELLITUS: Status: ACTIVE | Noted: 2021-06-07

## 2023-03-27 PROBLEM — E03.8 TSH DEFICIENCY: Status: ACTIVE | Noted: 2023-03-27

## 2023-03-27 RX ORDER — MULTIVITAMIN
1 TABLET ORAL DAILY
Qty: 90 TABLET | Refills: 0 | Status: SHIPPED | OUTPATIENT
Start: 2023-03-27

## 2023-03-27 RX ORDER — TRAMADOL HYDROCHLORIDE 50 MG/1
50 TABLET ORAL EVERY 12 HOURS PRN
Qty: 10 TABLET | Refills: 0 | Status: SHIPPED | OUTPATIENT
Start: 2023-03-27

## 2023-03-27 RX ORDER — PREGABALIN 150 MG/1
150 CAPSULE ORAL 2 TIMES DAILY
Qty: 60 CAPSULE | Refills: 1 | Status: SHIPPED | OUTPATIENT
Start: 2023-03-27

## 2023-03-27 RX ORDER — INSULIN ASPART 100 [IU]/ML
12 INJECTION, SOLUTION INTRAVENOUS; SUBCUTANEOUS
Qty: 15 ML | Refills: 2 | Status: SHIPPED | OUTPATIENT
Start: 2023-03-27

## 2023-03-27 RX ORDER — INSULIN DETEMIR 100 [IU]/ML
25 INJECTION, SOLUTION SUBCUTANEOUS NIGHTLY
Qty: 22.5 ML | Refills: 2 | Status: SHIPPED | OUTPATIENT
Start: 2023-03-27 | End: 2023-04-05

## 2023-03-27 NOTE — PROGRESS NOTES
Subjective:       Patient ID: Low Gutierrez is a 27 y.o. male.    Chief Complaint: Diabetes (Management )    Diabetes  Pertinent negatives for diabetes include no chest pain, no fatigue and no polyuria.   .  Patient seen and evaluated today.  He complains of excruciating severe diabetic neuropathy and both of his upper and lower extremities.  He states that he has been losing weight.  And has been in very poor shape.  Also states that his legs are sometimes cold.  Also complains of pain at rest in his lower extremities.  Also complains of moderate constipation.  The plan consult pain management refer to neurology for diabetic teaching nurse  BMP CBC hemoglobin A1c LFTs TSH urine for protein and albumin and check and evaluate HIV test.    Will check EMG and nerve conduction studies.  Due to the constipation will check a KUB.  Also get the refills Lyrica and increase it to 150 mg 1 p.o. b.i.d. we refill Ultram given 10 tablets problem on a multivitamin daily and order arterial Doppler studies of both lower extremities bilaterally.    Current Medications:    Current Outpatient Medications:     blood-glucose meter kit, Use as instructed, Disp: 1 each, Rfl: 5    insulin syringe-needle U-100 1 mL 31 gauge x 5/16 Syrg, 1 each by Misc.(Non-Drug; Combo Route) route 4 (four) times daily., Disp: 100 each, Rfl: 11    pregabalin (LYRICA) 100 MG capsule, Take 1 capsule (100 mg total) by mouth 2 (two) times daily., Disp: 60 capsule, Rfl: 6    sildenafiL (VIAGRA) 100 MG tablet, Take 0.5 tablets (50 mg total) by mouth daily as needed for Erectile Dysfunction., Disp: 20 tablet, Rfl: 3    EScitalopram oxalate (LEXAPRO) 10 MG tablet, Take 1 tablet (10 mg total) by mouth once daily. (Patient not taking: Reported on 3/23/2023), Disp: 30 tablet, Rfl: 0    insulin aspart U-100 (NOVOLOG) 100 unit/mL (3 mL) InPn pen, Inject 12 Units into the skin 3 (three) times daily with meals., Disp: 15 mL, Rfl: 2    insulin detemir U-100, Levemir,  "(LEVEMIR FLEXTOUCH U-100 INSULN) 100 unit/mL (3 mL) InPn pen, Inject 25 Units into the skin every evening., Disp: 22.5 mL, Rfl: 2    multivitamin (THERAGRAN) per tablet, Take 1 tablet by mouth once daily., Disp: 90 tablet, Rfl: 0    pregabalin (LYRICA) 150 MG capsule, Take 1 capsule (150 mg total) by mouth 2 (two) times daily., Disp: 60 capsule, Rfl: 1    traMADoL (ULTRAM) 50 mg tablet, Take 1 tablet (50 mg total) by mouth every 12 (twelve) hours as needed for Pain., Disp: 10 tablet, Rfl: 0           Review of Systems   Constitutional:  Negative for appetite change and fatigue.   HENT:  Negative for nasal congestion and rhinorrhea.    Eyes:  Negative for redness and visual disturbance.   Respiratory:  Negative for cough and shortness of breath.    Cardiovascular:  Negative for chest pain and leg swelling.   Gastrointestinal:  Negative for abdominal pain, constipation and diarrhea.   Endocrine: Negative for polyuria.   Genitourinary:  Negative for difficulty urinating, dysuria and erectile dysfunction.   Musculoskeletal:  Negative for arthralgias and myalgias.   Integumentary:  Negative for rash and mole/lesion.   All other systems reviewed and are negative.             Vitals:    03/23/23 0951   BP: 122/85   BP Location: Right arm   Patient Position: Sitting   BP Method: Large (Automatic)   Pulse: 110   Resp: 18   Temp: 97.3 °F (36.3 °C)   TempSrc: Temporal   SpO2: 97%   Weight: 66 kg (145 lb 9.6 oz)   Height: 6' 3" (1.905 m)        Physical Exam  Vitals and nursing note reviewed.   Constitutional:       Appearance: Normal appearance.   HENT:      Head: Normocephalic and atraumatic.      Nose: Nose normal.      Mouth/Throat:      Mouth: Mucous membranes are moist.      Pharynx: Oropharynx is clear.   Eyes:      Extraocular Movements: Extraocular movements intact.      Pupils: Pupils are equal, round, and reactive to light.   Cardiovascular:      Rate and Rhythm: Normal rate and regular rhythm.      Pulses: Normal " pulses.      Heart sounds: Normal heart sounds.   Pulmonary:      Effort: Pulmonary effort is normal.      Breath sounds: Normal breath sounds.   Abdominal:      General: Abdomen is flat. Bowel sounds are normal.      Palpations: Abdomen is soft.   Musculoskeletal:         General: Normal range of motion.      Cervical back: Normal range of motion and neck supple.      Right lower leg: No edema.      Left lower leg: No edema.   Skin:     General: Skin is warm and dry.      Coloration: Skin is not jaundiced or pale.      Findings: No rash.   Neurological:      General: No focal deficit present.      Mental Status: He is alert and oriented to person, place, and time. Mental status is at baseline.   Psychiatric:         Mood and Affect: Mood normal.         Thought Content: Thought content normal.         Last Labs:     Office Visit on 03/23/2023   Component Date Value    Sodium 03/23/2023 136     Potassium 03/23/2023 4.2     Chloride 03/23/2023 99     CO2 03/23/2023 33 (H)     Anion Gap 03/23/2023 8     Glucose 03/23/2023 406 (H)     BUN 03/23/2023 11     Creatinine 03/23/2023 0.72     BUN/Creatinine Ratio 03/23/2023 15     Calcium 03/23/2023 9.5     eGFR 03/23/2023 129     Hemoglobin A1C 03/23/2023 12.6 (H)     Estimated Average Glucose 03/23/2023 334     Total Protein 03/23/2023 7.3     Albumin 03/23/2023 4.0     Bilirubin, Total 03/23/2023 0.6     Bilirubin, Direct 03/23/2023 0.1     AST 03/23/2023 5 (L)     ALT 03/23/2023 13 (L)     Alk Phos 03/23/2023 97     TSH 03/23/2023 1.210     Creatinine, Urine 03/23/2023 88     Microalbumin 03/23/2023 2.0     Microalbumin/Creatinine * 03/23/2023 22.7     Protein, Urine 03/23/2023 16.1 (H)     HIV 1/2 03/23/2023 Non-Reactive     WBC 03/23/2023 6.21     RBC 03/23/2023 4.95     Hemoglobin 03/23/2023 14.7     Hematocrit 03/23/2023 43.5     MCV 03/23/2023 87.9     MCH 03/23/2023 29.7     MCHC 03/23/2023 33.8     RDW 03/23/2023 11.6     Platelet Count 03/23/2023 279     MPV  03/23/2023 12.3     Neutrophils % 03/23/2023 59.5     Lymphocytes % 03/23/2023 32.9     Monocytes % 03/23/2023 6.6 (H)     Eosinophils % 03/23/2023 0.2 (L)     Basophils % 03/23/2023 0.6     Immature Granulocytes % 03/23/2023 0.2     nRBC, Auto 03/23/2023 0.0     Neutrophils, Abs 03/23/2023 3.70     Lymphocytes, Absolute 03/23/2023 2.04     Monocytes, Absolute 03/23/2023 0.41     Eosinophils, Absolute 03/23/2023 0.01     Basophils, Absolute 03/23/2023 0.04     Immature Granulocytes, A* 03/23/2023 0.01     nRBC, Absolute 03/23/2023 0.00     Diff Type 03/23/2023 Auto    Office Visit on 02/27/2023   Component Date Value    POC Amphetamines 02/27/2023 Negative     POC Barbiturates 02/27/2023 Negative     POC Benzodiazepines 02/27/2023 Negative     POC Cocaine 02/27/2023 Negative     POC THC 02/27/2023 Presumptive Positive (A)     POC Methadone 02/27/2023 Negative     POC Methamphetamine 02/27/2023 Negative     POC Opiates 02/27/2023 Negative     POC Oxycodone 02/27/2023 Negative     POC Phencyclidine 02/27/2023 Negative     POC Methylenedioxymetham* 02/27/2023 Negative     POC Tricyclic Antidepres* 02/27/2023 Negative     POC Buprenorphine 02/27/2023 Negative      Acceptab* 02/27/2023 Yes     POC Temperature (Urine) 02/27/2023 98.4     See Scanned Report 02/27/2023 See Scanned Result        Last Imaging:  X-Ray KUB  Narrative: EXAMINATION:  XR KUB    CLINICAL HISTORY:  Constipation, unspecified    TECHNIQUE:  XR KUB    COMPARISON:  2017    FINDINGS:  Lower lobes are clear    There is no bowel obstruction.  No free air.  No renal calculi.    Mild to moderate colonic stool    No acute bone findings.  Impression: No bowel obstruction or renal calculi.  Mild to moderate colonic stool    Electronically signed by: Emigdio Stubbs  Date:    03/23/2023  Time:    11:33         **Labs and x-rays personally reviewed by me    ** reviewed      Objective:        Assessment:       1. Constipation, unspecified  constipation type  Hepatic Function Panel    X-Ray KUB    Hepatic Function Panel      2. Other specified diabetes mellitus with other specified complication, with long-term current use of insulin  insulin aspart U-100 (NOVOLOG) 100 unit/mL (3 mL) InPn pen    insulin detemir U-100, Levemir, (LEVEMIR FLEXTOUCH U-100 INSULN) 100 unit/mL (3 mL) InPn pen      3. Type 2 diabetes mellitus with diabetic neuropathy, with long-term current use of insulin  insulin aspart U-100 (NOVOLOG) 100 unit/mL (3 mL) InPn pen    insulin detemir U-100, Levemir, (LEVEMIR FLEXTOUCH U-100 INSULN) 100 unit/mL (3 mL) InPn pen    Hemoglobin A1C    Microalbumin/Creatinine Ratio, Urine    Protein, Random Urine    Hemoglobin A1C    Microalbumin/Creatinine Ratio, Urine    Protein, Random Urine    US Lower Extrem Arteries Bilat with KAILEY (xpd)      4. Screening for HIV (human immunodeficiency virus)  HIV 1/2 Ag/Ab (4th Gen)    HIV 1/2 Ag/Ab (4th Gen)      5. TSH deficiency  TSH    TSH      6. Erectile dysfunction, unspecified erectile dysfunction type  Basic Metabolic Panel    CBC Auto Differential    Basic Metabolic Panel    CBC Auto Differential      7. Other diabetic neurological complication associated with type 2 diabetes mellitus  EMG W/ ULTRASOUND AND NERVE CONDUCTION TEST 2 Extremities    Ambulatory referral/consult to Pain Clinic    Ambulatory referral/consult to Neurology    Ambulatory referral/consult to Diabetic Advanced Practice Providers (Medical Management)    US Lower Extrem Arteries Bilat with KAILEY (xpd)           Plan:         [unfilled]

## 2023-03-27 NOTE — TELEPHONE ENCOUNTER
----- Message from Xavier Santa MD sent at 3/26/2023  2:19 PM CDT -----  Need to see in  1 week please  abnl results     0928-call made to pt; pt made an appt for 4/5/23

## 2023-04-05 ENCOUNTER — OFFICE VISIT (OUTPATIENT)
Dept: FAMILY MEDICINE | Facility: CLINIC | Age: 28
End: 2023-04-05
Payer: MEDICARE

## 2023-04-05 VITALS
TEMPERATURE: 97 F | HEART RATE: 96 BPM | HEIGHT: 75 IN | BODY MASS INDEX: 17.58 KG/M2 | WEIGHT: 141.38 LBS | RESPIRATION RATE: 18 BRPM | OXYGEN SATURATION: 98 % | DIASTOLIC BLOOD PRESSURE: 80 MMHG | SYSTOLIC BLOOD PRESSURE: 115 MMHG

## 2023-04-05 DIAGNOSIS — E11.40 TYPE 2 DIABETES MELLITUS WITH DIABETIC NEUROPATHY, WITH LONG-TERM CURRENT USE OF INSULIN: ICD-10-CM

## 2023-04-05 DIAGNOSIS — Z79.4 OTHER SPECIFIED DIABETES MELLITUS WITH OTHER SPECIFIED COMPLICATION, WITH LONG-TERM CURRENT USE OF INSULIN: ICD-10-CM

## 2023-04-05 DIAGNOSIS — E13.69 OTHER SPECIFIED DIABETES MELLITUS WITH OTHER SPECIFIED COMPLICATION, WITH LONG-TERM CURRENT USE OF INSULIN: ICD-10-CM

## 2023-04-05 DIAGNOSIS — Z79.4 TYPE 2 DIABETES MELLITUS WITH HYPERGLYCEMIA, WITH LONG-TERM CURRENT USE OF INSULIN: Primary | ICD-10-CM

## 2023-04-05 DIAGNOSIS — Z79.4 TYPE 2 DIABETES MELLITUS WITH DIABETIC NEUROPATHY, WITH LONG-TERM CURRENT USE OF INSULIN: ICD-10-CM

## 2023-04-05 DIAGNOSIS — R80.9 PROTEINURIA, UNSPECIFIED TYPE: ICD-10-CM

## 2023-04-05 DIAGNOSIS — E11.65 TYPE 2 DIABETES MELLITUS WITH HYPERGLYCEMIA, WITH LONG-TERM CURRENT USE OF INSULIN: Primary | ICD-10-CM

## 2023-04-05 LAB
ANION GAP SERPL CALCULATED.3IONS-SCNC: 6 MMOL/L (ref 7–16)
BUN SERPL-MCNC: 7 MG/DL (ref 7–18)
BUN/CREAT SERPL: 9 (ref 6–20)
CALCIUM SERPL-MCNC: 9.4 MG/DL (ref 8.5–10.1)
CHLORIDE SERPL-SCNC: 97 MMOL/L (ref 98–107)
CO2 SERPL-SCNC: 33 MMOL/L (ref 21–32)
CREAT SERPL-MCNC: 0.82 MG/DL (ref 0.7–1.3)
EGFR (NO RACE VARIABLE) (RUSH/TITUS): 123 ML/MIN/1.73M²
GLUCOSE SERPL-MCNC: 372 MG/DL (ref 74–106)
POTASSIUM SERPL-SCNC: 3.6 MMOL/L (ref 3.5–5.1)
SODIUM SERPL-SCNC: 132 MMOL/L (ref 136–145)

## 2023-04-05 PROCEDURE — G0009 ADMIN PNEUMOCOCCAL VACCINE: HCPCS | Mod: ,,, | Performed by: INTERNAL MEDICINE

## 2023-04-05 PROCEDURE — 80048 BASIC METABOLIC PANEL: ICD-10-PCS | Mod: ,,, | Performed by: CLINICAL MEDICAL LABORATORY

## 2023-04-05 PROCEDURE — 80048 BASIC METABOLIC PNL TOTAL CA: CPT | Mod: ,,, | Performed by: CLINICAL MEDICAL LABORATORY

## 2023-04-05 PROCEDURE — 90677 PNEUMOCOCCAL CONJUGATE VACCINE 20-VALENT: ICD-10-PCS | Mod: ,,, | Performed by: INTERNAL MEDICINE

## 2023-04-05 PROCEDURE — 90677 PCV20 VACCINE IM: CPT | Mod: ,,, | Performed by: INTERNAL MEDICINE

## 2023-04-05 PROCEDURE — G0009 PNEUMOCOCCAL CONJUGATE VACCINE 20-VALENT: ICD-10-PCS | Mod: ,,, | Performed by: INTERNAL MEDICINE

## 2023-04-05 PROCEDURE — 99214 PR OFFICE/OUTPT VISIT, EST, LEVL IV, 30-39 MIN: ICD-10-PCS | Mod: ,,, | Performed by: INTERNAL MEDICINE

## 2023-04-05 PROCEDURE — 99214 OFFICE O/P EST MOD 30 MIN: CPT | Mod: ,,, | Performed by: INTERNAL MEDICINE

## 2023-04-05 RX ORDER — LANCETS
1 EACH MISCELLANEOUS DAILY
Qty: 100 EACH | Refills: 6 | Status: SHIPPED | OUTPATIENT
Start: 2023-04-05

## 2023-04-05 RX ORDER — INSULIN DETEMIR 100 [IU]/ML
17 INJECTION, SOLUTION SUBCUTANEOUS 2 TIMES DAILY
Qty: 22.5 ML | Refills: 2 | Status: SHIPPED | OUTPATIENT
Start: 2023-04-05 | End: 2023-06-28

## 2023-04-05 RX ORDER — INSULIN PUMP SYRINGE, 3 ML
EACH MISCELLANEOUS
Qty: 1 EACH | Refills: 0 | Status: SHIPPED | OUTPATIENT
Start: 2023-04-05

## 2023-04-05 NOTE — PROGRESS NOTES
Care gaps discussed with patient. Patient is due for pneumonia and tetanus vaccine. Due to patient insurance he will need to receive tetanus vaccine at pharmacy. Patient will receive Pneumonia vaccine today.

## 2023-04-06 ENCOUNTER — TELEPHONE (OUTPATIENT)
Dept: FAMILY MEDICINE | Facility: CLINIC | Age: 28
End: 2023-04-06
Payer: MEDICARE

## 2023-04-06 PROBLEM — R80.9 PROTEINURIA: Status: ACTIVE | Noted: 2023-04-06

## 2023-04-06 NOTE — TELEPHONE ENCOUNTER
----- Message from Xavier Santa MD sent at 4/6/2023  9:35 AM CDT -----  Need to see in  1 week please  abnl results   Elevated blood sugar    1026- call made to pt several times; pt girlfriend answered phone and stated she will have him call back when he is available.

## 2023-04-06 NOTE — PROGRESS NOTES
Subjective:       Patient ID: Low Gutierrez is a 28 y.o. male.    Chief Complaint: Results (Abnl labs) and Cold Extremity    HPI  .  Patient seen and evaluated.  He has poorly controlled diabetes mellitus hemoglobin A1c is elevated also is found to have proteinuria.  The plan increase Levemir to 17 units subQ b.i.d. check a follow-up BNP again today order him a freestyle   Refer to Geno Queen and Rach Santizo.    Right in order focal, motor needles and strips.    Additional plan tetanus vaccine and pneumonia vaccine    Current Medications:    Current Outpatient Medications:     blood-glucose meter kit, Use as instructed, Disp: 1 each, Rfl: 5    insulin aspart U-100 (NOVOLOG) 100 unit/mL (3 mL) InPn pen, Inject 12 Units into the skin 3 (three) times daily with meals., Disp: 15 mL, Rfl: 2    insulin syringe-needle U-100 1 mL 31 gauge x 5/16 Syrg, 1 each by Misc.(Non-Drug; Combo Route) route 4 (four) times daily., Disp: 100 each, Rfl: 11    multivitamin (THERAGRAN) per tablet, Take 1 tablet by mouth once daily., Disp: 90 tablet, Rfl: 0    pregabalin (LYRICA) 100 MG capsule, Take 1 capsule (100 mg total) by mouth 2 (two) times daily., Disp: 60 capsule, Rfl: 6    pregabalin (LYRICA) 150 MG capsule, Take 1 capsule (150 mg total) by mouth 2 (two) times daily., Disp: 60 capsule, Rfl: 1    sildenafiL (VIAGRA) 100 MG tablet, Take 0.5 tablets (50 mg total) by mouth daily as needed for Erectile Dysfunction., Disp: 20 tablet, Rfl: 3    traMADoL (ULTRAM) 50 mg tablet, Take 1 tablet (50 mg total) by mouth every 12 (twelve) hours as needed for Pain., Disp: 10 tablet, Rfl: 0    blood sugar diagnostic Strp, 1 strip by Misc.(Non-Drug; Combo Route) route 3 (three) times daily., Disp: 200 strip, Rfl: 3    blood-glucose meter kit, Use as instructed, Disp: 1 each, Rfl: 0    EScitalopram oxalate (LEXAPRO) 10 MG tablet, Take 1 tablet (10 mg total) by mouth once daily. (Patient not taking: Reported on 3/23/2023), Disp: 30 tablet, Rfl:  "0    insulin detemir U-100, Levemir, (LEVEMIR FLEXTOUCH U-100 INSULN) 100 unit/mL (3 mL) InPn pen, Inject 17 Units into the skin 2 (two) times daily., Disp: 22.5 mL, Rfl: 2    lancets Misc, Inject 1 each into the skin once daily., Disp: 100 each, Rfl: 6           Review of Systems   Constitutional:  Negative for appetite change and fatigue.   HENT:  Negative for nasal congestion and rhinorrhea.    Eyes:  Negative for redness and visual disturbance.   Respiratory:  Negative for cough and shortness of breath.    Cardiovascular:  Negative for chest pain and leg swelling.   Gastrointestinal:  Negative for abdominal pain, constipation and diarrhea.   Endocrine: Negative for polyuria.   Genitourinary:  Negative for difficulty urinating, dysuria and erectile dysfunction.   Musculoskeletal:  Negative for arthralgias and myalgias.   Integumentary:  Negative for rash and mole/lesion.   All other systems reviewed and are negative.             Vitals:    04/05/23 0931   BP: 115/80   BP Location: Right arm   Patient Position: Sitting   BP Method: Large (Automatic)   Pulse: 96   Resp: 18   Temp: 97.3 °F (36.3 °C)   TempSrc: Temporal   SpO2: 98%   Weight: 64.1 kg (141 lb 6.4 oz)   Height: 6' 3" (1.905 m)        Physical Exam  Vitals and nursing note reviewed.   Constitutional:       Appearance: Normal appearance.   Cardiovascular:      Rate and Rhythm: Normal rate and regular rhythm.      Pulses: Normal pulses.      Heart sounds: Normal heart sounds.   Pulmonary:      Effort: Pulmonary effort is normal.      Breath sounds: Normal breath sounds.   Abdominal:      General: Abdomen is flat. Bowel sounds are normal.      Palpations: Abdomen is soft.   Musculoskeletal:         General: Normal range of motion.   Skin:     General: Skin is warm and dry.   Neurological:      General: No focal deficit present.      Mental Status: He is alert and oriented to person, place, and time. Mental status is at baseline.         Last Labs:   "   Office Visit on 04/05/2023   Component Date Value    Sodium 04/05/2023 132 (L)     Potassium 04/05/2023 3.6     Chloride 04/05/2023 97 (L)     CO2 04/05/2023 33 (H)     Anion Gap 04/05/2023 6 (L)     Glucose 04/05/2023 372 (H)     BUN 04/05/2023 7     Creatinine 04/05/2023 0.82     BUN/Creatinine Ratio 04/05/2023 9     Calcium 04/05/2023 9.4     eGFR 04/05/2023 123    Office Visit on 03/23/2023   Component Date Value    Sodium 03/23/2023 136     Potassium 03/23/2023 4.2     Chloride 03/23/2023 99     CO2 03/23/2023 33 (H)     Anion Gap 03/23/2023 8     Glucose 03/23/2023 406 (H)     BUN 03/23/2023 11     Creatinine 03/23/2023 0.72     BUN/Creatinine Ratio 03/23/2023 15     Calcium 03/23/2023 9.5     eGFR 03/23/2023 129     Hemoglobin A1C 03/23/2023 12.6 (H)     Estimated Average Glucose 03/23/2023 334     Total Protein 03/23/2023 7.3     Albumin 03/23/2023 4.0     Bilirubin, Total 03/23/2023 0.6     Bilirubin, Direct 03/23/2023 0.1     AST 03/23/2023 5 (L)     ALT 03/23/2023 13 (L)     Alk Phos 03/23/2023 97     TSH 03/23/2023 1.210     Creatinine, Urine 03/23/2023 88     Microalbumin 03/23/2023 2.0     Microalbumin/Creatinine * 03/23/2023 22.7     Protein, Urine 03/23/2023 16.1 (H)     HIV 1/2 03/23/2023 Non-Reactive     WBC 03/23/2023 6.21     RBC 03/23/2023 4.95     Hemoglobin 03/23/2023 14.7     Hematocrit 03/23/2023 43.5     MCV 03/23/2023 87.9     MCH 03/23/2023 29.7     MCHC 03/23/2023 33.8     RDW 03/23/2023 11.6     Platelet Count 03/23/2023 279     MPV 03/23/2023 12.3     Neutrophils % 03/23/2023 59.5     Lymphocytes % 03/23/2023 32.9     Monocytes % 03/23/2023 6.6 (H)     Eosinophils % 03/23/2023 0.2 (L)     Basophils % 03/23/2023 0.6     Immature Granulocytes % 03/23/2023 0.2     nRBC, Auto 03/23/2023 0.0     Neutrophils, Abs 03/23/2023 3.70     Lymphocytes, Absolute 03/23/2023 2.04     Monocytes, Absolute 03/23/2023 0.41     Eosinophils, Absolute 03/23/2023 0.01     Basophils, Absolute 03/23/2023 0.04      Immature Granulocytes, A* 03/23/2023 0.01     nRBC, Absolute 03/23/2023 0.00     Diff Type 03/23/2023 Auto        Last Imaging:  X-Ray [facility]  Narrative: EXAMINATION:  XR [facility]    CLINICAL HISTORY:  Constipation, unspecified    TECHNIQUE:  XR [facility]    COMPARISON:  2017    FINDINGS:  Lower lobes are clear    There is no bowel obstruction.  No free air.  No renal calculi.    Mild to moderate colonic stool    No acute bone findings.  Impression: No bowel obstruction or renal calculi.  Mild to moderate colonic stool    Electronically signed by: Emigdio Stubbs  Date:    03/23/2023  Time:    11:33         **Labs and x-rays personally reviewed by me    ** reviewed      Objective:        Assessment:       1. Type 2 diabetes mellitus with hyperglycemia, with long-term current use of insulin  Ambulatory referral/consult to Diabetic Advanced Practice Providers (Medical Management)      2. Proteinuria, unspecified type  Basic Metabolic Panel    Basic Metabolic Panel      3. Other specified diabetes mellitus with other specified complication, with long-term current use of insulin  insulin detemir U-100, Levemir, (LEVEMIR FLEXTOUCH U-100 INSULN) 100 unit/mL (3 mL) InPn pen      4. Type 2 diabetes mellitus with diabetic neuropathy, with long-term current use of insulin  insulin detemir U-100, Levemir, (LEVEMIR FLEXTOUCH U-100 INSULN) 100 unit/mL (3 mL) InPn pen           Plan:         [unfilled]

## 2023-04-20 ENCOUNTER — PATIENT MESSAGE (OUTPATIENT)
Dept: ADMINISTRATIVE | Facility: HOSPITAL | Age: 28
End: 2023-04-20

## 2023-04-20 ENCOUNTER — PATIENT OUTREACH (OUTPATIENT)
Dept: ADMINISTRATIVE | Facility: HOSPITAL | Age: 28
End: 2023-04-20

## 2023-04-20 NOTE — PROGRESS NOTES
04/20/2023 Care Everywhere updates requested and reviewed.  Immunizations reconciled. Media reports reviewed.  Duplicate HM overrides removed.  Overdue HM topic chart audit and/or requested.     Hyperlinked 2021 Eye Exam  Care Team Updated  Message sent via Patient Portal regarding Overdue HM Topics    Health Maintenance Due   Topic Date Due    TETANUS VACCINE  Never done    Eye Exam  08/02/2022    COVID-19 Vaccine (2 - Moderna series) 02/01/2023

## 2023-06-26 ENCOUNTER — HOSPITAL ENCOUNTER (EMERGENCY)
Facility: HOSPITAL | Age: 28
Discharge: HOME | End: 2023-06-26
Payer: MEDICARE

## 2023-06-26 VITALS
TEMPERATURE: 98 F | HEART RATE: 112 BPM | BODY MASS INDEX: 16.66 KG/M2 | SYSTOLIC BLOOD PRESSURE: 128 MMHG | RESPIRATION RATE: 18 BRPM | DIASTOLIC BLOOD PRESSURE: 81 MMHG | HEIGHT: 75 IN | OXYGEN SATURATION: 100 % | WEIGHT: 134 LBS

## 2023-06-26 DIAGNOSIS — K59.00 CONSTIPATION, UNSPECIFIED CONSTIPATION TYPE: ICD-10-CM

## 2023-06-26 DIAGNOSIS — R73.9 HYPERGLYCEMIA: Primary | ICD-10-CM

## 2023-06-26 DIAGNOSIS — R00.0 TACHYCARDIA: ICD-10-CM

## 2023-06-26 LAB
ACETONE SERPL QL SCN: NEGATIVE
ANION GAP SERPL CALCULATED.3IONS-SCNC: 10 MMOL/L (ref 7–16)
BASOPHILS # BLD AUTO: 0.02 K/UL (ref 0–0.2)
BASOPHILS NFR BLD AUTO: 0.4 % (ref 0–1)
BUN SERPL-MCNC: 10 MG/DL (ref 7–18)
BUN/CREAT SERPL: 14 (ref 6–20)
CALCIUM SERPL-MCNC: 9.7 MG/DL (ref 8.5–10.1)
CHLORIDE SERPL-SCNC: 97 MMOL/L (ref 98–107)
CO2 SERPL-SCNC: 32 MMOL/L (ref 21–32)
CREAT SERPL-MCNC: 0.7 MG/DL (ref 0.7–1.3)
DIFFERENTIAL METHOD BLD: ABNORMAL
EGFR (NO RACE VARIABLE) (RUSH/TITUS): 130 ML/MIN/1.73M2
EOSINOPHIL # BLD AUTO: 0.01 K/UL (ref 0–0.5)
EOSINOPHIL NFR BLD AUTO: 0.2 % (ref 1–4)
ERYTHROCYTE [DISTWIDTH] IN BLOOD BY AUTOMATED COUNT: 11.9 % (ref 11.5–14.5)
GLUCOSE SERPL-MCNC: 303 MG/DL (ref 74–106)
GLUCOSE SERPL-MCNC: 316 MG/DL (ref 70–105)
HCT VFR BLD AUTO: 41.3 % (ref 40–54)
HGB BLD-MCNC: 14.3 G/DL (ref 13.5–18)
IMM GRANULOCYTES # BLD AUTO: 0.01 K/UL (ref 0–0.04)
IMM GRANULOCYTES NFR BLD: 0.2 % (ref 0–0.4)
LYMPHOCYTES # BLD AUTO: 1.76 K/UL (ref 1–4.8)
LYMPHOCYTES NFR BLD AUTO: 34.9 % (ref 27–41)
MCH RBC QN AUTO: 31.1 PG (ref 27–31)
MCHC RBC AUTO-ENTMCNC: 34.6 G/DL (ref 32–36)
MCV RBC AUTO: 89.8 FL (ref 80–96)
MONOCYTES # BLD AUTO: 0.44 K/UL (ref 0–0.8)
MONOCYTES NFR BLD AUTO: 8.7 % (ref 2–6)
MPC BLD CALC-MCNC: 11 FL (ref 9.4–12.4)
NEUTROPHILS # BLD AUTO: 2.81 K/UL (ref 1.8–7.7)
NEUTROPHILS NFR BLD AUTO: 55.6 % (ref 53–65)
NRBC # BLD AUTO: 0 X10E3/UL
NRBC, AUTO (.00): 0 %
PLATELET # BLD AUTO: 273 K/UL (ref 150–400)
POTASSIUM SERPL-SCNC: 3.7 MMOL/L (ref 3.5–5.1)
RBC # BLD AUTO: 4.6 M/UL (ref 4.6–6.2)
SODIUM SERPL-SCNC: 135 MMOL/L (ref 136–145)
TSH SERPL DL<=0.005 MIU/L-ACNC: 0.02 UIU/ML (ref 0.36–3.74)
WBC # BLD AUTO: 5.05 K/UL (ref 4.5–11)

## 2023-06-26 PROCEDURE — 25000003 PHARM REV CODE 250: Performed by: NURSE PRACTITIONER

## 2023-06-26 PROCEDURE — 80048 BASIC METABOLIC PNL TOTAL CA: CPT | Performed by: NURSE PRACTITIONER

## 2023-06-26 PROCEDURE — 96360 HYDRATION IV INFUSION INIT: CPT

## 2023-06-26 PROCEDURE — 96361 HYDRATE IV INFUSION ADD-ON: CPT

## 2023-06-26 PROCEDURE — 99284 EMERGENCY DEPT VISIT MOD MDM: CPT | Mod: ,,, | Performed by: NURSE PRACTITIONER

## 2023-06-26 PROCEDURE — 99285 EMERGENCY DEPT VISIT HI MDM: CPT | Mod: 25

## 2023-06-26 PROCEDURE — 93010 EKG 12-LEAD: ICD-10-PCS | Mod: ,,, | Performed by: HOSPITALIST

## 2023-06-26 PROCEDURE — 82962 GLUCOSE BLOOD TEST: CPT

## 2023-06-26 PROCEDURE — 93010 ELECTROCARDIOGRAM REPORT: CPT | Mod: ,,, | Performed by: HOSPITALIST

## 2023-06-26 PROCEDURE — 84443 ASSAY THYROID STIM HORMONE: CPT | Performed by: NURSE PRACTITIONER

## 2023-06-26 PROCEDURE — 93005 ELECTROCARDIOGRAM TRACING: CPT

## 2023-06-26 PROCEDURE — 99284 PR EMERGENCY DEPT VISIT,LEVEL IV: ICD-10-PCS | Mod: ,,, | Performed by: NURSE PRACTITIONER

## 2023-06-26 PROCEDURE — 82009 KETONE BODYS QUAL: CPT | Performed by: NURSE PRACTITIONER

## 2023-06-26 PROCEDURE — 85025 COMPLETE CBC W/AUTO DIFF WBC: CPT | Performed by: NURSE PRACTITIONER

## 2023-06-26 RX ORDER — DOCUSATE SODIUM 100 MG/1
100 CAPSULE, LIQUID FILLED ORAL 2 TIMES DAILY
Qty: 60 CAPSULE | Refills: 0 | Status: SHIPPED | OUTPATIENT
Start: 2023-06-26

## 2023-06-26 RX ORDER — POLYETHYLENE GLYCOL 3350 17 G/17G
17 POWDER, FOR SOLUTION ORAL DAILY
Qty: 30 EACH | Refills: 0 | Status: SHIPPED | OUTPATIENT
Start: 2023-06-26

## 2023-06-26 RX ADMIN — SODIUM CHLORIDE 1000 ML: 9 INJECTION, SOLUTION INTRAVENOUS at 02:06

## 2023-06-26 RX ADMIN — SODIUM CHLORIDE 1000 ML: 9 INJECTION, SOLUTION INTRAVENOUS at 03:06

## 2023-06-26 NOTE — ED PROVIDER NOTES
Encounter Date: 6/26/2023       History     Chief Complaint   Patient presents with    Dizziness    Constipation     28 year old male presents to the emergency department to be evaluated for feeling lightheaded, constipation, tired, no energy, and elevated glucose for the last several days.     The history is provided by the patient.   Review of patient's allergies indicates:   Allergen Reactions    Wasp venom      Past Medical History:   Diagnosis Date    Diabetes mellitus, type 2      Past Surgical History:   Procedure Laterality Date    right arm surgery      at birth     No family history on file.  Social History     Tobacco Use    Smoking status: Every Day     Types: Cigarettes    Smokeless tobacco: Never   Substance Use Topics    Alcohol use: Yes     Comment: occasionally    Drug use: Yes     Types: Marijuana     Comment: 1 day ago use     Review of Systems   Constitutional:  Negative for chills and fever.   Respiratory:  Negative for cough and shortness of breath.    Gastrointestinal:  Positive for constipation. Negative for abdominal pain, nausea and vomiting.   All other systems reviewed and are negative.    Physical Exam     Initial Vitals [06/26/23 1258]   BP Pulse Resp Temp SpO2   127/76 (!) 145 16 97.9 °F (36.6 °C) 98 %      MAP       --         Physical Exam    Vitals reviewed.  Constitutional: He appears well-developed and well-nourished.   Neck: Neck supple.   Cardiovascular:            tachycardic   Pulmonary/Chest: Breath sounds normal.   Abdominal: Abdomen is soft. Bowel sounds are normal. He exhibits no distension and no mass. There is no abdominal tenderness. There is no rebound and no guarding.   Musculoskeletal:      Cervical back: Neck supple.     Neurological: He is alert and oriented to person, place, and time. He has normal strength. GCS score is 15. GCS eye subscore is 4. GCS verbal subscore is 5. GCS motor subscore is 6.   Skin: Skin is warm and dry. Capillary refill takes less than 2  seconds.   Psychiatric: He has a normal mood and affect.       Medical Screening Exam   See Full Note    ED Course   Procedures  Labs Reviewed   BASIC METABOLIC PANEL - Abnormal; Notable for the following components:       Result Value    Sodium 135 (*)     Chloride 97 (*)     Glucose 303 (*)     All other components within normal limits   CBC WITH DIFFERENTIAL - Abnormal; Notable for the following components:    MCH 31.1 (*)     Monocytes % 8.7 (*)     Eosinophils % 0.2 (*)     All other components within normal limits   POCT GLUCOSE MONITORING CONTINUOUS - Abnormal; Notable for the following components:    POC Glucose 316 (*)     All other components within normal limits   CBC W/ AUTO DIFFERENTIAL    Narrative:     The following orders were created for panel order CBC auto differential.  Procedure                               Abnormality         Status                     ---------                               -----------         ------                     CBC with Differential[[id number]]        Abnormal            Final result                 Please view results for these tests on the individual orders.   ACETONE   URINALYSIS   EXTRA TUBES    Narrative:     The following orders were created for panel order EXTRA TUBES.  Procedure                               Abnormality         Status                     ---------                               -----------         ------                     Light Blue Top Hold[[id number]]                              In process                   Please view results for these tests on the individual orders.   LIGHT BLUE TOP HOLD   TSH     EKG Readings: (Independently Interpreted)   Initial Reading: No STEMI. Rhythm: Sinus Tachycardia. Heart Rate: 131.   ECG Results              EKG 12-lead (In process)  Result time 06/26/23 15:34:34      In process by Interface, Lab In [facility] (06/26/23 15:34:34)                   Narrative:    Test Reason : R00.0,    Vent. Rate : 131 BPM      Atrial Rate : 131 BPM     P-R Int : 148 ms          QRS Dur : 072 ms      QT Int : 302 ms       P-R-T Axes : 080 083 077 degrees     QTc Int : 445 ms    Sinus tachycardia  Early repolarization  Otherwise normal ECG  No previous ECGs available    Referred By: [email or web address]   SELF           Confirmed By:                                   Imaging Results              XR ABDOMEN, ACUTE 2 OR MORE VIEWS WITH CHEST (Final result)  Result time 23 14:23:33      Final result by Marito Lovell DO (23 14:23:33)                   Impression:      No acute findings.    Point of Service: [facility]      Electronically signed by: Marito Lovell  Date:    2023  Time:    14:23               Narrative:    EXAMINATION:  XR ABDOMEN ACUTE 2 OR MORE VIEWS WITH CHEST    CLINICAL HISTORY:  constipation;    COMPARISON:  [facility] 2023    TECHNIQUE:  Single frontal view of the chest as well as frontal views of the abdomen in the supine and upright  position.    FINDINGS:  Heart size appears within normal limits.  Mild S-shaped scoliotic curvature of the spine.  No focal consolidation, pleural effusion, or pneumothorax.  Nonspecific nonobstructive bowel gas pattern.  No free intraperitoneal air demonstrated.                                       Medications   sodium chloride 0.9% bolus 1,000 mL 1,000 mL (has no administration in time range)   sodium chloride 0.9% bolus 1,000 mL 1,000 mL (1,000 mLs Intravenous New Bag 23 1419)     Medical Decision Makin year old male presents to the emergency department to be evaluated for feeling lightheaded, constipation, tired, no energy, and elevated glucose.                        Clinical Impression:   Final diagnoses:  [R00.0] Tachycardia  [R73.9] Hyperglycemia (Primary)  [K59.00] Constipation, unspecified constipation type        ED Disposition Condition    Discharge Stable          ED Prescriptions       Medication Sig Dispense Start Date End Date Auth.  Provider    docusate sodium (COLACE) 100 MG capsule Take 1 capsule (100 mg total) by mouth 2 (two) times daily. 60 capsule 6/26/2023 -- WANDA Chery    polyethylene glycol (GLYCOLAX) 17 gram PwPk Take 17 g by mouth once daily. 30 each 6/26/2023 -- WANDA Chery          Follow-up Information    None          WANDA Chery  06/26/23 1536

## 2023-06-26 NOTE — DISCHARGE INSTRUCTIONS
Take colace and miralax as directed. Follow up with your primary care provider in 2 days. Follow up with Chio Santizo NP; you should be contacted with an appointment. Return to the emergency department for any increase in symptoms or for any other new or worrisome symptoms.

## 2023-06-27 ENCOUNTER — TELEPHONE (OUTPATIENT)
Dept: EMERGENCY MEDICINE | Facility: HOSPITAL | Age: 28
End: 2023-06-27
Payer: MEDICARE

## 2023-06-27 NOTE — TELEPHONE ENCOUNTER
----- Message from WANDA Chery sent at 6/27/2023  8:26 AM CDT -----  Please have the patient follow up with his pcp regarding his abnormal tsh

## 2023-06-28 ENCOUNTER — OFFICE VISIT (OUTPATIENT)
Dept: FAMILY MEDICINE | Facility: CLINIC | Age: 28
End: 2023-06-28
Payer: MEDICARE

## 2023-06-28 VITALS
HEART RATE: 111 BPM | SYSTOLIC BLOOD PRESSURE: 128 MMHG | HEIGHT: 75 IN | TEMPERATURE: 98 F | RESPIRATION RATE: 17 BRPM | BODY MASS INDEX: 17.03 KG/M2 | WEIGHT: 137 LBS | DIASTOLIC BLOOD PRESSURE: 89 MMHG | OXYGEN SATURATION: 99 %

## 2023-06-28 DIAGNOSIS — Z79.4 OTHER SPECIFIED DIABETES MELLITUS WITH OTHER SPECIFIED COMPLICATION, WITH LONG-TERM CURRENT USE OF INSULIN: ICD-10-CM

## 2023-06-28 DIAGNOSIS — E11.40 TYPE 2 DIABETES MELLITUS WITH DIABETIC NEUROPATHY, WITH LONG-TERM CURRENT USE OF INSULIN: ICD-10-CM

## 2023-06-28 DIAGNOSIS — E13.69 OTHER SPECIFIED DIABETES MELLITUS WITH OTHER SPECIFIED COMPLICATION, WITH LONG-TERM CURRENT USE OF INSULIN: ICD-10-CM

## 2023-06-28 DIAGNOSIS — Z79.4 TYPE 2 DIABETES MELLITUS WITH DIABETIC NEUROPATHY, WITH LONG-TERM CURRENT USE OF INSULIN: ICD-10-CM

## 2023-06-28 DIAGNOSIS — E05.90 HYPERTHYROIDISM: Primary | ICD-10-CM

## 2023-06-28 PROCEDURE — 99214 PR OFFICE/OUTPT VISIT, EST, LEVL IV, 30-39 MIN: ICD-10-PCS | Mod: ,,, | Performed by: INTERNAL MEDICINE

## 2023-06-28 PROCEDURE — 99214 OFFICE O/P EST MOD 30 MIN: CPT | Mod: ,,, | Performed by: INTERNAL MEDICINE

## 2023-06-28 RX ORDER — INSULIN DETEMIR 100 [IU]/ML
20 INJECTION, SOLUTION SUBCUTANEOUS 2 TIMES DAILY
Qty: 22.5 ML | Refills: 2 | Status: SHIPPED | OUTPATIENT
Start: 2023-06-28 | End: 2023-08-02

## 2023-07-05 PROBLEM — E05.90 HYPERTHYROIDISM: Status: ACTIVE | Noted: 2023-07-05

## 2023-07-05 NOTE — PROGRESS NOTES
Subjective:       Patient ID: Low Gutierrez is a 28 y.o. male.    Chief Complaint: Thyroid Problem and Foot Pain (Right )  28-year-old male seen today.  Patient does have evidence of hyperthyroidism also patient has type 2 diabetes mellitus.  Blood sugars have been elevated.  Also A1c has been elevated the plan increase Levemir to 20 units subQ b.i.d..  Thyroid is slightly larger exam will order ultrasound of the thyroid.  Thyroid Problem  Patient reports no constipation, diarrhea or fatigue.   Foot Pain  Pertinent negatives include no abdominal pain, arthralgias, chest pain, congestion, coughing, fatigue, myalgias or rash.   .    Current Medications:    Current Outpatient Medications:     blood sugar diagnostic Strp, 1 strip by Misc.(Non-Drug; Combo Route) route 3 (three) times daily., Disp: 200 strip, Rfl: 3    blood-glucose meter kit, Use as instructed, Disp: 1 each, Rfl: 5    blood-glucose meter kit, Use as instructed, Disp: 1 each, Rfl: 0    docusate sodium (COLACE) 100 MG capsule, Take 1 capsule (100 mg total) by mouth 2 (two) times daily., Disp: 60 capsule, Rfl: 0    insulin aspart U-100 (NOVOLOG) 100 unit/mL (3 mL) InPn pen, Inject 12 Units into the skin 3 (three) times daily with meals., Disp: 15 mL, Rfl: 2    insulin syringe-needle U-100 1 mL 31 gauge x 5/16 Syrg, 1 each by Misc.(Non-Drug; Combo Route) route 4 (four) times daily., Disp: 100 each, Rfl: 11    lancets Misc, Inject 1 each into the skin once daily., Disp: 100 each, Rfl: 6    multivitamin (THERAGRAN) per tablet, Take 1 tablet by mouth once daily., Disp: 90 tablet, Rfl: 0    pregabalin (LYRICA) 100 MG capsule, Take 1 capsule (100 mg total) by mouth 2 (two) times daily., Disp: 60 capsule, Rfl: 6    pregabalin (LYRICA) 150 MG capsule, Take 1 capsule (150 mg total) by mouth 2 (two) times daily., Disp: 60 capsule, Rfl: 1    sildenafiL (VIAGRA) 100 MG tablet, Take 0.5 tablets (50 mg total) by mouth daily as needed for Erectile Dysfunction., Disp:  "20 tablet, Rfl: 3    traMADoL (ULTRAM) 50 mg tablet, Take 1 tablet (50 mg total) by mouth every 12 (twelve) hours as needed for Pain., Disp: 10 tablet, Rfl: 0    EScitalopram oxalate (LEXAPRO) 10 MG tablet, Take 1 tablet (10 mg total) by mouth once daily. (Patient not taking: Reported on 3/23/2023), Disp: 30 tablet, Rfl: 0    insulin detemir U-100, Levemir, (LEVEMIR FLEXTOUCH U100 INSULIN) 100 unit/mL (3 mL) InPn pen, Inject 20 Units into the skin 2 (two) times daily., Disp: 22.5 mL, Rfl: 2    polyethylene glycol (GLYCOLAX) 17 gram PwPk, Take 17 g by mouth once daily. (Patient not taking: Reported on 6/28/2023), Disp: 30 each, Rfl: 0           Review of Systems   Constitutional:  Negative for appetite change and fatigue.   HENT:  Negative for nasal congestion and rhinorrhea.    Eyes:  Negative for redness and visual disturbance.   Respiratory:  Negative for cough and shortness of breath.    Cardiovascular:  Negative for chest pain and leg swelling.   Gastrointestinal:  Negative for abdominal pain, constipation and diarrhea.   Endocrine: Negative for polyuria.   Genitourinary:  Negative for difficulty urinating, dysuria and erectile dysfunction.   Musculoskeletal:  Negative for arthralgias and myalgias.   Integumentary:  Negative for rash and mole/lesion.   All other systems reviewed and are negative.             Vitals:    06/28/23 0904 06/28/23 0905   BP: 128/89    BP Location: Right arm    Patient Position: Sitting    BP Method: Large (Automatic)    Pulse: (!) 115 (!) 111   Resp: 17    Temp: 97.5 °F (36.4 °C)    TempSrc: Temporal    SpO2: 99%    Weight: 62.1 kg (137 lb)    Height: 6' 3" (1.905 m)         Physical Exam  Vitals and nursing note reviewed.   Constitutional:       Appearance: Normal appearance.   Cardiovascular:      Rate and Rhythm: Normal rate and regular rhythm.      Pulses: Normal pulses.      Heart sounds: Normal heart sounds.   Pulmonary:      Effort: Pulmonary effort is normal.      Breath " sounds: Normal breath sounds.   Abdominal:      General: Abdomen is flat. Bowel sounds are normal.      Palpations: Abdomen is soft.   Musculoskeletal:         General: Normal range of motion.   Skin:     General: Skin is warm and dry.   Neurological:      General: No focal deficit present.      Mental Status: He is alert and oriented to person, place, and time. Mental status is at baseline.         Last Labs:     Admission on 06/26/2023, Discharged on 06/26/2023   Component Date Value    POC Glucose 06/26/2023 316 (H)     Sodium 06/26/2023 135 (L)     Potassium 06/26/2023 3.7     Chloride 06/26/2023 97 (L)     CO2 06/26/2023 32     Anion Gap 06/26/2023 10     Glucose 06/26/2023 303 (H)     BUN 06/26/2023 10     Creatinine 06/26/2023 0.70     BUN/Creatinine Ratio 06/26/2023 14     Calcium 06/26/2023 9.7     eGFR 06/26/2023 130     Acetone 06/26/2023 Negative     WBC 06/26/2023 5.05     RBC 06/26/2023 4.60     Hemoglobin 06/26/2023 14.3     Hematocrit 06/26/2023 41.3     MCV 06/26/2023 89.8     MCH 06/26/2023 31.1 (H)     MCHC 06/26/2023 34.6     RDW 06/26/2023 11.9     Platelet Count 06/26/2023 273     MPV 06/26/2023 11.0     Neutrophils % 06/26/2023 55.6     Lymphocytes % 06/26/2023 34.9     Monocytes % 06/26/2023 8.7 (H)     Eosinophils % 06/26/2023 0.2 (L)     Basophils % 06/26/2023 0.4     Immature Granulocytes % 06/26/2023 0.2     nRBC, Auto 06/26/2023 0.0     Neutrophils, Abs 06/26/2023 2.81     Lymphocytes, Absolute 06/26/2023 1.76     Monocytes, Absolute 06/26/2023 0.44     Eosinophils, Absolute 06/26/2023 0.01     Basophils, Absolute 06/26/2023 0.02     Immature Granulocytes, A* 06/26/2023 0.01     nRBC, Absolute 06/26/2023 0.00     Diff Type 06/26/2023 Auto     TSH 06/26/2023 0.023 (L)        Last Imaging:  XR ABDOMEN, ACUTE 2 OR MORE VIEWS WITH CHEST  Narrative: EXAMINATION:  XR ABDOMEN ACUTE 2 OR MORE VIEWS WITH CHEST    CLINICAL HISTORY:  constipation;    COMPARISON:  [facility] March 23,  2023    TECHNIQUE:  Single frontal view of the chest as well as frontal views of the abdomen in the supine and upright  position.    FINDINGS:  Heart size appears within normal limits.  Mild S-shaped scoliotic curvature of the spine.  No focal consolidation, pleural effusion, or pneumothorax.  Nonspecific nonobstructive bowel gas pattern.  No free intraperitoneal air demonstrated.  Impression: No acute findings.    Point of Service: [facility]    Electronically signed by: Marito Lovell  Date:    06/26/2023  Time:    14:23         **Labs and x-rays personally reviewed by me    ** reviewed      Objective:        Assessment:       1. Hyperthyroidism  US Thyroid      2. Other specified diabetes mellitus with other specified complication, with long-term current use of insulin  insulin detemir U-100, Levemir, (LEVEMIR FLEXTOUCH U100 INSULIN) 100 unit/mL (3 mL) InPn pen      3. Type 2 diabetes mellitus with diabetic neuropathy, with long-term current use of insulin  insulin detemir U-100, Levemir, (LEVEMIR FLEXTOUCH U100 INSULIN) 100 unit/mL (3 mL) InPn pen           Plan:         [unfilled]

## 2023-07-14 ENCOUNTER — HOSPITAL ENCOUNTER (OUTPATIENT)
Dept: RADIOLOGY | Facility: HOSPITAL | Age: 28
Discharge: HOME | End: 2023-07-14
Attending: INTERNAL MEDICINE
Payer: MEDICARE

## 2023-07-14 DIAGNOSIS — E05.90 HYPERTHYROIDISM: ICD-10-CM

## 2023-07-14 PROCEDURE — 76536 US EXAM OF HEAD AND NECK: CPT | Mod: 26,,, | Performed by: RADIOLOGY

## 2023-07-14 PROCEDURE — 76536 US EXAM OF HEAD AND NECK: CPT | Mod: TC

## 2023-07-14 PROCEDURE — 76536 US THYROID: ICD-10-PCS | Mod: 26,,, | Performed by: RADIOLOGY

## 2023-07-17 ENCOUNTER — TELEPHONE (OUTPATIENT)
Dept: FAMILY MEDICINE | Facility: CLINIC | Age: 28
End: 2023-07-17
Payer: MEDICARE

## 2023-07-17 NOTE — TELEPHONE ENCOUNTER
----- Message from Xavier Santa MD sent at 7/16/2023  5:03 PM CDT -----  Need to see in  1 week please  abnl results     0808 Pt is already scheduled to come in on 7/19/23

## 2023-07-19 ENCOUNTER — OFFICE VISIT (OUTPATIENT)
Dept: FAMILY MEDICINE | Facility: CLINIC | Age: 28
End: 2023-07-19
Payer: MEDICARE

## 2023-07-19 VITALS
BODY MASS INDEX: 17.05 KG/M2 | WEIGHT: 137.13 LBS | TEMPERATURE: 98 F | OXYGEN SATURATION: 98 % | DIASTOLIC BLOOD PRESSURE: 81 MMHG | RESPIRATION RATE: 18 BRPM | HEIGHT: 75 IN | SYSTOLIC BLOOD PRESSURE: 115 MMHG | HEART RATE: 104 BPM

## 2023-07-19 DIAGNOSIS — Z79.4 TYPE 2 DIABETES MELLITUS WITH DIABETIC NEUROPATHY, WITH LONG-TERM CURRENT USE OF INSULIN: Primary | ICD-10-CM

## 2023-07-19 DIAGNOSIS — E13.69 OTHER SPECIFIED DIABETES MELLITUS WITH OTHER SPECIFIED COMPLICATION, WITH LONG-TERM CURRENT USE OF INSULIN: ICD-10-CM

## 2023-07-19 DIAGNOSIS — E11.40 TYPE 2 DIABETES MELLITUS WITH DIABETIC NEUROPATHY, WITH LONG-TERM CURRENT USE OF INSULIN: Primary | ICD-10-CM

## 2023-07-19 DIAGNOSIS — E01.0 THYROMEGALY: ICD-10-CM

## 2023-07-19 DIAGNOSIS — E34.9 TESTOSTERONE DEFICIENCY: ICD-10-CM

## 2023-07-19 DIAGNOSIS — E53.8 VITAMIN B12 DEFICIENCY: ICD-10-CM

## 2023-07-19 DIAGNOSIS — Z79.4 OTHER SPECIFIED DIABETES MELLITUS WITH OTHER SPECIFIED COMPLICATION, WITH LONG-TERM CURRENT USE OF INSULIN: ICD-10-CM

## 2023-07-19 LAB
ANION GAP SERPL CALCULATED.3IONS-SCNC: 11 MMOL/L (ref 7–16)
BASOPHILS # BLD AUTO: 0.03 K/UL (ref 0–0.2)
BASOPHILS NFR BLD AUTO: 0.5 % (ref 0–1)
BUN SERPL-MCNC: 11 MG/DL (ref 7–18)
BUN/CREAT SERPL: 14 (ref 6–20)
CALCIUM SERPL-MCNC: 10 MG/DL (ref 8.5–10.1)
CHLORIDE SERPL-SCNC: 97 MMOL/L (ref 98–107)
CO2 SERPL-SCNC: 31 MMOL/L (ref 21–32)
CREAT SERPL-MCNC: 0.77 MG/DL (ref 0.7–1.3)
DIFFERENTIAL METHOD BLD: ABNORMAL
EGFR (NO RACE VARIABLE) (RUSH/TITUS): 126 ML/MIN/1.73M2
EOSINOPHIL # BLD AUTO: 0.03 K/UL (ref 0–0.5)
EOSINOPHIL NFR BLD AUTO: 0.5 % (ref 1–4)
ERYTHROCYTE [DISTWIDTH] IN BLOOD BY AUTOMATED COUNT: 11.9 % (ref 11.5–14.5)
EST. AVERAGE GLUCOSE BLD GHB EST-MCNC: 297 MG/DL
GLUCOSE SERPL-MCNC: 350 MG/DL (ref 74–106)
HBA1C MFR BLD HPLC: 11.5 % (ref 4.5–6.6)
HCT VFR BLD AUTO: 44.4 % (ref 40–54)
HGB BLD-MCNC: 15.2 G/DL (ref 13.5–18)
IMM GRANULOCYTES # BLD AUTO: 0.01 K/UL (ref 0–0.04)
IMM GRANULOCYTES NFR BLD: 0.2 % (ref 0–0.4)
LYMPHOCYTES # BLD AUTO: 2.42 K/UL (ref 1–4.8)
LYMPHOCYTES NFR BLD AUTO: 38.7 % (ref 27–41)
MCH RBC QN AUTO: 30.2 PG (ref 27–31)
MCHC RBC AUTO-ENTMCNC: 34.2 G/DL (ref 32–36)
MCV RBC AUTO: 88.1 FL (ref 80–96)
MONOCYTES # BLD AUTO: 0.32 K/UL (ref 0–0.8)
MONOCYTES NFR BLD AUTO: 5.1 % (ref 2–6)
MPC BLD CALC-MCNC: 11.7 FL (ref 9.4–12.4)
NEUTROPHILS # BLD AUTO: 3.44 K/UL (ref 1.8–7.7)
NEUTROPHILS NFR BLD AUTO: 55 % (ref 53–65)
NRBC # BLD AUTO: 0 X10E3/UL
NRBC, AUTO (.00): 0 %
PLATELET # BLD AUTO: 327 K/UL (ref 150–400)
POTASSIUM SERPL-SCNC: 4 MMOL/L (ref 3.5–5.1)
RBC # BLD AUTO: 5.04 M/UL (ref 4.6–6.2)
SODIUM SERPL-SCNC: 135 MMOL/L (ref 136–145)
TESTOST SERPL-MCNC: 177 NG/DL (ref 240–950)
VIT B12 SERPL-MCNC: 1063 PG/ML (ref 193–986)
WBC # BLD AUTO: 6.25 K/UL (ref 4.5–11)

## 2023-07-19 PROCEDURE — 84403 TESTOSTERONE: ICD-10-PCS | Mod: ,,, | Performed by: CLINICAL MEDICAL LABORATORY

## 2023-07-19 PROCEDURE — 83036 HEMOGLOBIN GLYCOSYLATED A1C: CPT | Mod: ,,, | Performed by: CLINICAL MEDICAL LABORATORY

## 2023-07-19 PROCEDURE — 82607 VITAMIN B12: ICD-10-PCS | Mod: ,,, | Performed by: CLINICAL MEDICAL LABORATORY

## 2023-07-19 PROCEDURE — 99214 PR OFFICE/OUTPT VISIT, EST, LEVL IV, 30-39 MIN: ICD-10-PCS | Mod: ,,, | Performed by: INTERNAL MEDICINE

## 2023-07-19 PROCEDURE — 85025 COMPLETE CBC W/AUTO DIFF WBC: CPT | Mod: ,,, | Performed by: CLINICAL MEDICAL LABORATORY

## 2023-07-19 PROCEDURE — 85025 CBC WITH DIFFERENTIAL: ICD-10-PCS | Mod: ,,, | Performed by: CLINICAL MEDICAL LABORATORY

## 2023-07-19 PROCEDURE — 84403 ASSAY OF TOTAL TESTOSTERONE: CPT | Mod: ,,, | Performed by: CLINICAL MEDICAL LABORATORY

## 2023-07-19 PROCEDURE — 80048 BASIC METABOLIC PANEL: ICD-10-PCS | Mod: ,,, | Performed by: CLINICAL MEDICAL LABORATORY

## 2023-07-19 PROCEDURE — 83036 HEMOGLOBIN A1C: ICD-10-PCS | Mod: ,,, | Performed by: CLINICAL MEDICAL LABORATORY

## 2023-07-19 PROCEDURE — 82607 VITAMIN B-12: CPT | Mod: ,,, | Performed by: CLINICAL MEDICAL LABORATORY

## 2023-07-19 PROCEDURE — 99214 OFFICE O/P EST MOD 30 MIN: CPT | Mod: ,,, | Performed by: INTERNAL MEDICINE

## 2023-07-19 PROCEDURE — 80048 BASIC METABOLIC PNL TOTAL CA: CPT | Mod: ,,, | Performed by: CLINICAL MEDICAL LABORATORY

## 2023-07-20 ENCOUNTER — TELEPHONE (OUTPATIENT)
Dept: FAMILY MEDICINE | Facility: CLINIC | Age: 28
End: 2023-07-20
Payer: MEDICARE

## 2023-07-20 RX ORDER — PROPRANOLOL HYDROCHLORIDE 10 MG/1
10 TABLET ORAL 2 TIMES DAILY
Qty: 60 TABLET | Refills: 1 | Status: SHIPPED | OUTPATIENT
Start: 2023-07-20

## 2023-07-20 NOTE — TELEPHONE ENCOUNTER
----- Message from Xavier Santa MD sent at 7/20/2023 10:49 AM CDT -----  Need to see in  1 week please  abnl results     1311 Pt is scheduled to come in on 7/27/23

## 2023-07-25 PROBLEM — E53.8 VITAMIN B12 DEFICIENCY: Status: ACTIVE | Noted: 2023-07-25

## 2023-07-25 PROBLEM — E01.0 THYROMEGALY: Status: ACTIVE | Noted: 2023-07-25

## 2023-07-25 PROBLEM — E34.9 TESTOSTERONE DEFICIENCY: Status: ACTIVE | Noted: 2023-07-25

## 2023-07-25 NOTE — PROGRESS NOTES
Subjective:       Patient ID: Low Gutierrez is a 28 y.o. male.    Chief Complaint: Hyperparathyroidism and Pain (All over body )  .  He complains of severe chronic fatigue patient does have diabetes which is chronic patient also has thyroid megaly 2 patient has been losing weight.  The plan refer to endocrinology start a multivitamin check a BMP CBC hemoglobin A1c will check a testosterone level B12 level for the fatigue.  Start propanolol 10 mg 1 p.o. b.i.d..            Pain  Associated symptoms include fatigue. Pertinent negatives include no abdominal pain, arthralgias, chest pain, congestion, coughing, myalgias or rash.   .    Current Medications:    Current Outpatient Medications:     blood sugar diagnostic Strp, 1 strip by Misc.(Non-Drug; Combo Route) route 3 (three) times daily., Disp: 200 strip, Rfl: 3    blood-glucose meter kit, Use as instructed, Disp: 1 each, Rfl: 5    blood-glucose meter kit, Use as instructed, Disp: 1 each, Rfl: 0    docusate sodium (COLACE) 100 MG capsule, Take 1 capsule (100 mg total) by mouth 2 (two) times daily., Disp: 60 capsule, Rfl: 0    insulin aspart U-100 (NOVOLOG) 100 unit/mL (3 mL) InPn pen, Inject 12 Units into the skin 3 (three) times daily with meals., Disp: 15 mL, Rfl: 2    insulin detemir U-100, Levemir, (LEVEMIR FLEXTOUCH U100 INSULIN) 100 unit/mL (3 mL) InPn pen, Inject 20 Units into the skin 2 (two) times daily., Disp: 22.5 mL, Rfl: 2    insulin syringe-needle U-100 1 mL 31 gauge x 5/16 Syrg, 1 each by Misc.(Non-Drug; Combo Route) route 4 (four) times daily., Disp: 100 each, Rfl: 11    lancets Misc, Inject 1 each into the skin once daily., Disp: 100 each, Rfl: 6    multivitamin (THERAGRAN) per tablet, Take 1 tablet by mouth once daily., Disp: 90 tablet, Rfl: 0    pregabalin (LYRICA) 100 MG capsule, Take 1 capsule (100 mg total) by mouth 2 (two) times daily., Disp: 60 capsule, Rfl: 6    pregabalin (LYRICA) 150 MG capsule, Take 1 capsule (150 mg total) by mouth 2  "(two) times daily., Disp: 60 capsule, Rfl: 1    sildenafiL (VIAGRA) 100 MG tablet, Take 0.5 tablets (50 mg total) by mouth daily as needed for Erectile Dysfunction., Disp: 20 tablet, Rfl: 3    traMADoL (ULTRAM) 50 mg tablet, Take 1 tablet (50 mg total) by mouth every 12 (twelve) hours as needed for Pain., Disp: 10 tablet, Rfl: 0    EScitalopram oxalate (LEXAPRO) 10 MG tablet, Take 1 tablet (10 mg total) by mouth once daily. (Patient not taking: Reported on 3/23/2023), Disp: 30 tablet, Rfl: 0    multivitamin Tab, Take 1 tablet by mouth once daily., Disp: 90 tablet, Rfl: 1    polyethylene glycol (GLYCOLAX) 17 gram PwPk, Take 17 g by mouth once daily. (Patient not taking: Reported on 6/28/2023), Disp: 30 each, Rfl: 0    propranoloL (INDERAL) 10 MG tablet, Take 1 tablet (10 mg total) by mouth 2 (two) times daily., Disp: 60 tablet, Rfl: 1           Review of Systems   Constitutional:  Positive for fatigue. Negative for appetite change.   HENT:  Negative for nasal congestion and rhinorrhea.    Eyes:  Negative for redness and visual disturbance.   Respiratory:  Negative for cough and shortness of breath.    Cardiovascular:  Negative for chest pain and leg swelling.   Gastrointestinal:  Negative for abdominal pain, constipation and diarrhea.   Endocrine: Negative for polyuria.   Genitourinary:  Negative for difficulty urinating, dysuria and erectile dysfunction.   Musculoskeletal:  Negative for arthralgias and myalgias.   Integumentary:  Negative for rash and mole/lesion.   All other systems reviewed and are negative.             Vitals:    07/19/23 0932   BP: 115/81   BP Location: Right arm   Patient Position: Sitting   BP Method: Large (Automatic)   Pulse: 104   Resp: 18   Temp: 98.1 °F (36.7 °C)   TempSrc: Temporal   SpO2: 98%   Weight: 62.2 kg (137 lb 1.6 oz)   Height: 6' 3" (1.905 m)        Physical Exam  Vitals and nursing note reviewed.   Constitutional:       Appearance: Normal appearance.   Cardiovascular:      " Rate and Rhythm: Normal rate and regular rhythm.      Pulses: Normal pulses.      Heart sounds: Normal heart sounds.   Pulmonary:      Effort: Pulmonary effort is normal.      Breath sounds: Normal breath sounds.   Abdominal:      General: Abdomen is flat. Bowel sounds are normal.      Palpations: Abdomen is soft.   Musculoskeletal:         General: Normal range of motion.   Skin:     General: Skin is warm and dry.   Neurological:      General: No focal deficit present.      Mental Status: He is alert and oriented to person, place, and time. Mental status is at baseline.         Last Labs:     Office Visit on 07/19/2023   Component Date Value    Sodium 07/19/2023 135 (L)     Potassium 07/19/2023 4.0     Chloride 07/19/2023 97 (L)     CO2 07/19/2023 31     Anion Gap 07/19/2023 11     Glucose 07/19/2023 350 (H)     BUN 07/19/2023 11     Creatinine 07/19/2023 0.77     BUN/Creatinine Ratio 07/19/2023 14     Calcium 07/19/2023 10.0     eGFR 07/19/2023 126     Hemoglobin A1C 07/19/2023 11.5 (H)     Estimated Average Glucose 07/19/2023 297     Vitamin B12 07/19/2023 1,063 (H)     Testosterone 07/19/2023 177.0 (L)     WBC 07/19/2023 6.25     RBC 07/19/2023 5.04     Hemoglobin 07/19/2023 15.2     Hematocrit 07/19/2023 44.4     MCV 07/19/2023 88.1     MCH 07/19/2023 30.2     MCHC 07/19/2023 34.2     RDW 07/19/2023 11.9     Platelet Count 07/19/2023 327     MPV 07/19/2023 11.7     Neutrophils % 07/19/2023 55.0     Lymphocytes % 07/19/2023 38.7     Monocytes % 07/19/2023 5.1     Eosinophils % 07/19/2023 0.5 (L)     Basophils % 07/19/2023 0.5     Immature Granulocytes % 07/19/2023 0.2     nRBC, Auto 07/19/2023 0.0     Neutrophils, Abs 07/19/2023 3.44     Lymphocytes, Absolute 07/19/2023 2.42     Monocytes, Absolute 07/19/2023 0.32     Eosinophils, Absolute 07/19/2023 0.03     Basophils, Absolute 07/19/2023 0.03     Immature Granulocytes, A* 07/19/2023 0.01     nRBC, Absolute 07/19/2023 0.00     Diff Type 07/19/2023 Auto     Admission on 06/26/2023, Discharged on 06/26/2023   Component Date Value    POC Glucose 06/26/2023 316 (H)     Sodium 06/26/2023 135 (L)     Potassium 06/26/2023 3.7     Chloride 06/26/2023 97 (L)     CO2 06/26/2023 32     Anion Gap 06/26/2023 10     Glucose 06/26/2023 303 (H)     BUN 06/26/2023 10     Creatinine 06/26/2023 0.70     BUN/Creatinine Ratio 06/26/2023 14     Calcium 06/26/2023 9.7     eGFR 06/26/2023 130     Acetone 06/26/2023 Negative     WBC 06/26/2023 5.05     RBC 06/26/2023 4.60     Hemoglobin 06/26/2023 14.3     Hematocrit 06/26/2023 41.3     MCV 06/26/2023 89.8     MCH 06/26/2023 31.1 (H)     MCHC 06/26/2023 34.6     RDW 06/26/2023 11.9     Platelet Count 06/26/2023 273     MPV 06/26/2023 11.0     Neutrophils % 06/26/2023 55.6     Lymphocytes % 06/26/2023 34.9     Monocytes % 06/26/2023 8.7 (H)     Eosinophils % 06/26/2023 0.2 (L)     Basophils % 06/26/2023 0.4     Immature Granulocytes % 06/26/2023 0.2     nRBC, Auto 06/26/2023 0.0     Neutrophils, Abs 06/26/2023 2.81     Lymphocytes, Absolute 06/26/2023 1.76     Monocytes, Absolute 06/26/2023 0.44     Eosinophils, Absolute 06/26/2023 0.01     Basophils, Absolute 06/26/2023 0.02     Immature Granulocytes, A* 06/26/2023 0.01     nRBC, Absolute 06/26/2023 0.00     Diff Type 06/26/2023 Auto     TSH 06/26/2023 0.023 (L)        Last Imaging:  US Thyroid  Narrative: EXAMINATION:  US THYROID    CLINICAL HISTORY:  Thyrotoxicosis, unspecified without thyrotoxic crisis or storm    TECHNIQUE:  Gray scale and color Doppler imaging of the thyroid performed    COMPARISON:  None.    FINDINGS:  Thyroid gland is slightly increased in size and echogenicity. The right thyroid lobe length is 6.2 cm. The left thyroid lobe length is 5.0 cm. No nodule or mass seen. There is slightly increased doppler waveform pattern.  Impression: Slight enlargement of the thyroid gland with increased vascularity could indicate thyroiditis.    Ultrasound images stored and  captured.    Electronically signed by: Remy Evans  Date:    07/14/2023  Time:    09:53         **Labs and x-rays personally reviewed by me    ** reviewed      Objective:        Assessment:       1. Type 2 diabetes mellitus with diabetic neuropathy, with long-term current use of insulin        2. Other specified diabetes mellitus with other specified complication, with long-term current use of insulin  Basic Metabolic Panel    CBC Auto Differential    Hemoglobin A1C    Basic Metabolic Panel    CBC Auto Differential    Hemoglobin A1C      3. Testosterone deficiency  Testosterone    Testosterone      4. Vitamin B12 deficiency  Vitamin B12    Vitamin B12      5. Thyromegaly  Ambulatory referral/consult to Endocrinology           Plan:         [unfilled]

## 2023-08-02 ENCOUNTER — OFFICE VISIT (OUTPATIENT)
Dept: FAMILY MEDICINE | Facility: CLINIC | Age: 28
End: 2023-08-02
Payer: MEDICARE

## 2023-08-02 VITALS
HEART RATE: 96 BPM | WEIGHT: 143.19 LBS | TEMPERATURE: 97 F | BODY MASS INDEX: 19.39 KG/M2 | HEIGHT: 72 IN | OXYGEN SATURATION: 97 % | DIASTOLIC BLOOD PRESSURE: 77 MMHG | SYSTOLIC BLOOD PRESSURE: 112 MMHG | RESPIRATION RATE: 18 BRPM

## 2023-08-02 DIAGNOSIS — Z79.4 OTHER SPECIFIED DIABETES MELLITUS WITH OTHER SPECIFIED COMPLICATION, WITH LONG-TERM CURRENT USE OF INSULIN: ICD-10-CM

## 2023-08-02 DIAGNOSIS — H10.31 ACUTE BACTERIAL CONJUNCTIVITIS OF RIGHT EYE: Primary | ICD-10-CM

## 2023-08-02 DIAGNOSIS — E13.69 OTHER SPECIFIED DIABETES MELLITUS WITH OTHER SPECIFIED COMPLICATION, WITH LONG-TERM CURRENT USE OF INSULIN: ICD-10-CM

## 2023-08-02 DIAGNOSIS — Z79.4 TYPE 2 DIABETES MELLITUS WITH DIABETIC NEUROPATHY, WITH LONG-TERM CURRENT USE OF INSULIN: ICD-10-CM

## 2023-08-02 DIAGNOSIS — E11.40 TYPE 2 DIABETES MELLITUS WITH DIABETIC NEUROPATHY, WITH LONG-TERM CURRENT USE OF INSULIN: ICD-10-CM

## 2023-08-02 PROCEDURE — 99214 PR OFFICE/OUTPT VISIT, EST, LEVL IV, 30-39 MIN: ICD-10-PCS | Mod: ,,, | Performed by: INTERNAL MEDICINE

## 2023-08-02 PROCEDURE — 99214 OFFICE O/P EST MOD 30 MIN: CPT | Mod: ,,, | Performed by: INTERNAL MEDICINE

## 2023-08-03 RX ORDER — GENTAMICIN SULFATE 3 MG/ML
2 SOLUTION/ DROPS OPHTHALMIC 4 TIMES DAILY
Qty: 15 ML | Refills: 0 | Status: SHIPPED | OUTPATIENT
Start: 2023-08-03

## 2023-08-03 RX ORDER — INSULIN DETEMIR 100 [IU]/ML
25 INJECTION, SOLUTION SUBCUTANEOUS 2 TIMES DAILY
Qty: 22.5 ML | Refills: 2 | Status: SHIPPED | OUTPATIENT
Start: 2023-08-03

## 2023-08-03 RX ORDER — TESTOSTERONE CYPIONATE 1000 MG/10ML
200 INJECTION, SOLUTION INTRAMUSCULAR
Qty: 10 ML | Refills: 1 | Status: SHIPPED | OUTPATIENT
Start: 2023-08-03

## 2023-08-08 PROBLEM — H10.31 ACUTE BACTERIAL CONJUNCTIVITIS OF RIGHT EYE: Status: ACTIVE | Noted: 2023-08-08

## 2023-08-08 NOTE — PROGRESS NOTES
Subjective:       Patient ID: Low Gutierrez is a 28 y.o. male.    Chief Complaint: Results (Abnl lab results ) and Eye Pain (Right eye is swollen and red )    Eye Pain   Pertinent negatives include no eye redness.   Patient complains of drainage and redness to the right eye for the last 1-2 days.  Patient has poorly controlled diabetes mellitus with a hemoglobin A1c of 11.5.  He also has testosterone deficiency.  The plan start gentamicin eyedrops 2 drops to right eye q.i.d. increase Levemir to 25 units subQ b.i.d. start testosterone 1 mL IM Q 14 days  .    Current Medications:    Current Outpatient Medications:     blood sugar diagnostic Strp, 1 strip by Misc.(Non-Drug; Combo Route) route 3 (three) times daily., Disp: 200 strip, Rfl: 3    blood-glucose meter kit, Use as instructed, Disp: 1 each, Rfl: 5    blood-glucose meter kit, Use as instructed, Disp: 1 each, Rfl: 0    docusate sodium (COLACE) 100 MG capsule, Take 1 capsule (100 mg total) by mouth 2 (two) times daily., Disp: 60 capsule, Rfl: 0    insulin aspart U-100 (NOVOLOG) 100 unit/mL (3 mL) InPn pen, Inject 12 Units into the skin 3 (three) times daily with meals., Disp: 15 mL, Rfl: 2    insulin syringe-needle U-100 1 mL 31 gauge x 5/16 Syrg, 1 each by Misc.(Non-Drug; Combo Route) route 4 (four) times daily., Disp: 100 each, Rfl: 11    lancets Misc, Inject 1 each into the skin once daily., Disp: 100 each, Rfl: 6    multivitamin (THERAGRAN) per tablet, Take 1 tablet by mouth once daily., Disp: 90 tablet, Rfl: 0    multivitamin Tab, Take 1 tablet by mouth once daily., Disp: 90 tablet, Rfl: 1    pregabalin (LYRICA) 100 MG capsule, Take 1 capsule (100 mg total) by mouth 2 (two) times daily., Disp: 60 capsule, Rfl: 6    pregabalin (LYRICA) 150 MG capsule, Take 1 capsule (150 mg total) by mouth 2 (two) times daily., Disp: 60 capsule, Rfl: 1    propranoloL (INDERAL) 10 MG tablet, Take 1 tablet (10 mg total) by mouth 2 (two) times daily., Disp: 60 tablet, Rfl:  1    sildenafiL (VIAGRA) 100 MG tablet, Take 0.5 tablets (50 mg total) by mouth daily as needed for Erectile Dysfunction., Disp: 20 tablet, Rfl: 3    traMADoL (ULTRAM) 50 mg tablet, Take 1 tablet (50 mg total) by mouth every 12 (twelve) hours as needed for Pain., Disp: 10 tablet, Rfl: 0    EScitalopram oxalate (LEXAPRO) 10 MG tablet, Take 1 tablet (10 mg total) by mouth once daily. (Patient not taking: Reported on 3/23/2023), Disp: 30 tablet, Rfl: 0    gentamicin (GARAMYCIN) 0.3 % ophthalmic solution, Place 2 drops into the right eye 4 (four) times daily., Disp: 15 mL, Rfl: 0    insulin detemir U-100, Levemir, (LEVEMIR FLEXTOUCH U100 INSULIN) 100 unit/mL (3 mL) InPn pen, Inject 25 Units into the skin 2 (two) times daily., Disp: 22.5 mL, Rfl: 2    polyethylene glycol (GLYCOLAX) 17 gram PwPk, Take 17 g by mouth once daily. (Patient not taking: Reported on 6/28/2023), Disp: 30 each, Rfl: 0    testosterone cypionate (DEPOTESTOTERONE CYPIONATE) 100 mg/mL injection, Inject 2 mLs (200 mg total) into the muscle every 14 (fourteen) days., Disp: 10 mL, Rfl: 1           Review of Systems   Constitutional:  Negative for appetite change and fatigue.   HENT:  Negative for nasal congestion and rhinorrhea.    Eyes:  Positive for pain. Negative for redness and visual disturbance.   Respiratory:  Negative for cough and shortness of breath.    Cardiovascular:  Negative for chest pain and leg swelling.   Gastrointestinal:  Negative for abdominal pain, constipation and diarrhea.   Endocrine: Negative for polyuria.   Genitourinary:  Negative for difficulty urinating, dysuria and erectile dysfunction.   Musculoskeletal:  Negative for arthralgias and myalgias.   Integumentary:  Negative for rash and mole/lesion.   All other systems reviewed and are negative.               Vitals:    08/02/23 0954   BP: 112/77   BP Location: Right arm   Patient Position: Sitting   BP Method: Large (Automatic)   Pulse: 96   Resp: 18   Temp: 97.3 °F (36.3 °C)    TempSrc: Temporal   SpO2: 97%   Weight: 65 kg (143 lb 3.2 oz)   Height: 6' (1.829 m)        Physical Exam  Vitals and nursing note reviewed.   Constitutional:       Appearance: Normal appearance.   Cardiovascular:      Rate and Rhythm: Normal rate and regular rhythm.      Pulses: Normal pulses.      Heart sounds: Normal heart sounds.   Pulmonary:      Effort: Pulmonary effort is normal.      Breath sounds: Normal breath sounds.   Abdominal:      General: Abdomen is flat. Bowel sounds are normal.      Palpations: Abdomen is soft.   Musculoskeletal:         General: Normal range of motion.   Skin:     General: Skin is warm and dry.   Neurological:      General: No focal deficit present.      Mental Status: He is alert and oriented to person, place, and time. Mental status is at baseline.           Last Labs:     Office Visit on 07/19/2023   Component Date Value    Sodium 07/19/2023 135 (L)     Potassium 07/19/2023 4.0     Chloride 07/19/2023 97 (L)     CO2 07/19/2023 31     Anion Gap 07/19/2023 11     Glucose 07/19/2023 350 (H)     BUN 07/19/2023 11     Creatinine 07/19/2023 0.77     BUN/Creatinine Ratio 07/19/2023 14     Calcium 07/19/2023 10.0     eGFR 07/19/2023 126     Hemoglobin A1C 07/19/2023 11.5 (H)     Estimated Average Glucose 07/19/2023 297     Vitamin B12 07/19/2023 1,063 (H)     Testosterone 07/19/2023 177.0 (L)     WBC 07/19/2023 6.25     RBC 07/19/2023 5.04     Hemoglobin 07/19/2023 15.2     Hematocrit 07/19/2023 44.4     MCV 07/19/2023 88.1     MCH 07/19/2023 30.2     MCHC 07/19/2023 34.2     RDW 07/19/2023 11.9     Platelet Count 07/19/2023 327     MPV 07/19/2023 11.7     Neutrophils % 07/19/2023 55.0     Lymphocytes % 07/19/2023 38.7     Monocytes % 07/19/2023 5.1     Eosinophils % 07/19/2023 0.5 (L)     Basophils % 07/19/2023 0.5     Immature Granulocytes % 07/19/2023 0.2     nRBC, Auto 07/19/2023 0.0     Neutrophils, Abs 07/19/2023 3.44     Lymphocytes, Absolute 07/19/2023 2.42     Monocytes,  Absolute 07/19/2023 0.32     Eosinophils, Absolute 07/19/2023 0.03     Basophils, Absolute 07/19/2023 0.03     Immature Granulocytes, A* 07/19/2023 0.01     nRBC, Absolute 07/19/2023 0.00     Diff Type 07/19/2023 Auto        Last Imaging:  US Thyroid  Narrative: EXAMINATION:  US THYROID    CLINICAL HISTORY:  Thyrotoxicosis, unspecified without thyrotoxic crisis or storm    TECHNIQUE:  Gray scale and color Doppler imaging of the thyroid performed    COMPARISON:  None.    FINDINGS:  Thyroid gland is slightly increased in size and echogenicity. The right thyroid lobe length is 6.2 cm. The left thyroid lobe length is 5.0 cm. No nodule or mass seen. There is slightly increased doppler waveform pattern.  Impression: Slight enlargement of the thyroid gland with increased vascularity could indicate thyroiditis.    Ultrasound images stored and captured.    Electronically signed by: Remy Evans  Date:    07/14/2023  Time:    09:53         **Labs and x-rays personally reviewed by me    ** reviewed      Objective:        Assessment:       1. Acute bacterial conjunctivitis of right eye        2. Other specified diabetes mellitus with other specified complication, with long-term current use of insulin  insulin detemir U-100, Levemir, (LEVEMIR FLEXTOUCH U100 INSULIN) 100 unit/mL (3 mL) InPn pen      3. Type 2 diabetes mellitus with diabetic neuropathy, with long-term current use of insulin  insulin detemir U-100, Levemir, (LEVEMIR FLEXTOUCH U100 INSULIN) 100 unit/mL (3 mL) InPn pen           Plan:         [unfilled]

## 2023-09-09 DIAGNOSIS — Z71.89 COMPLEX CARE COORDINATION: ICD-10-CM

## 2024-01-04 ENCOUNTER — OFFICE VISIT (OUTPATIENT)
Dept: FAMILY MEDICINE | Facility: CLINIC | Age: 29
End: 2024-01-04
Payer: MEDICARE

## 2024-01-04 VITALS
BODY MASS INDEX: 19.24 KG/M2 | RESPIRATION RATE: 18 BRPM | TEMPERATURE: 98 F | SYSTOLIC BLOOD PRESSURE: 128 MMHG | HEIGHT: 76 IN | DIASTOLIC BLOOD PRESSURE: 80 MMHG | OXYGEN SATURATION: 99 % | HEART RATE: 94 BPM | WEIGHT: 158 LBS

## 2024-01-04 DIAGNOSIS — K04.7 DENTAL ABSCESS: Primary | ICD-10-CM

## 2024-01-04 PROCEDURE — 99213 OFFICE O/P EST LOW 20 MIN: CPT | Mod: ,,, | Performed by: NURSE PRACTITIONER

## 2024-01-04 RX ORDER — AMOXICILLIN AND CLAVULANATE POTASSIUM 875; 125 MG/1; MG/1
1 TABLET, FILM COATED ORAL 2 TIMES DAILY
Qty: 20 TABLET | Refills: 0 | Status: SHIPPED | OUTPATIENT
Start: 2024-01-04 | End: 2024-01-14

## 2024-01-04 NOTE — PROGRESS NOTES
Subjective:       Patient ID: Low Gutierrez is a 28 y.o. male.    Chief Complaint: Abscess (On gums. )    Dental abscess    Abscess  Associated Symptoms: no fever    Review of Systems   Constitutional:  Negative for appetite change, fatigue and fever.   HENT:  Positive for dental problem. Negative for nasal congestion, ear pain and sore throat.    Eyes:  Negative for pain, discharge and itching.   Respiratory:  Negative for cough and shortness of breath.    Cardiovascular:  Negative for chest pain and leg swelling.   Gastrointestinal:  Negative for abdominal pain, change in bowel habit, nausea and vomiting.   Musculoskeletal:  Negative for back pain, gait problem and neck pain.   Integumentary:  Negative for rash and wound.   Neurological:  Negative for dizziness, weakness and headaches.   All other systems reviewed and are negative.        Objective:      Physical Exam  Vitals and nursing note reviewed.   Constitutional:       General: He is not in acute distress.     Appearance: Normal appearance. He is not ill-appearing, toxic-appearing or diaphoretic.   HENT:      Head: Normocephalic.      Right Ear: Tympanic membrane, ear canal and external ear normal.      Left Ear: Tympanic membrane, ear canal and external ear normal.      Nose: Nose normal. No congestion or rhinorrhea.      Mouth/Throat:      Mouth: Mucous membranes are moist.      Dentition: Dental abscesses present.      Pharynx: No oropharyngeal exudate or posterior oropharyngeal erythema.        Comments: A 2 cm raised, erythematous abscess noted to the sublingual region  Eyes:      General: No scleral icterus.        Right eye: No discharge.         Left eye: No discharge.      Extraocular Movements: Extraocular movements intact.      Conjunctiva/sclera: Conjunctivae normal.      Pupils: Pupils are equal, round, and reactive to light.   Cardiovascular:      Rate and Rhythm: Normal rate and regular rhythm.      Pulses: Normal pulses.      Heart  sounds: Normal heart sounds. No murmur heard.  Pulmonary:      Effort: Pulmonary effort is normal. No respiratory distress.      Breath sounds: Normal breath sounds. No wheezing, rhonchi or rales.   Musculoskeletal:         General: Normal range of motion.      Cervical back: Neck supple. No tenderness.   Lymphadenopathy:      Cervical: No cervical adenopathy.   Skin:     General: Skin is warm and dry.      Capillary Refill: Capillary refill takes less than 2 seconds.      Findings: No rash.   Neurological:      Mental Status: He is alert and oriented to person, place, and time.   Psychiatric:         Mood and Affect: Mood normal.         Behavior: Behavior normal.         Thought Content: Thought content normal.         Judgment: Judgment normal.            Assessment:       1. Dental abscess        Plan:   Dental abscess  -     amoxicillin-clavulanate 875-125mg (AUGMENTIN) 875-125 mg per tablet; Take 1 tablet by mouth 2 (two) times daily. for 20 doses  Dispense: 20 tablet; Refill: 0  -     Ambulatory referral/consult to Oral Maxillofacial Surgery; Future; Expected date: 01/11/2024         Risks, benefits, and side effects were discussed with the patient. All questions were answered to the fullest satisfaction of the patient, and pt verbalized understanding and agreement to treatment plan. Pt was to call with any new or worsening symptoms, or present to the ER

## 2025-02-13 ENCOUNTER — OFFICE VISIT (OUTPATIENT)
Dept: FAMILY MEDICINE | Facility: CLINIC | Age: 30
End: 2025-02-13
Payer: MEDICARE

## 2025-02-13 VITALS
DIASTOLIC BLOOD PRESSURE: 83 MMHG | OXYGEN SATURATION: 98 % | HEIGHT: 76 IN | BODY MASS INDEX: 19.1 KG/M2 | RESPIRATION RATE: 18 BRPM | SYSTOLIC BLOOD PRESSURE: 116 MMHG | WEIGHT: 156.81 LBS | TEMPERATURE: 98 F | HEART RATE: 108 BPM

## 2025-02-13 DIAGNOSIS — E13.69 OTHER SPECIFIED DIABETES MELLITUS WITH OTHER SPECIFIED COMPLICATION, WITH LONG-TERM CURRENT USE OF INSULIN: ICD-10-CM

## 2025-02-13 DIAGNOSIS — R00.0 TACHYCARDIA: Primary | ICD-10-CM

## 2025-02-13 DIAGNOSIS — Z79.4 TYPE 2 DIABETES MELLITUS WITH DIABETIC NEUROPATHY, WITH LONG-TERM CURRENT USE OF INSULIN: ICD-10-CM

## 2025-02-13 DIAGNOSIS — R79.89 LOW TSH LEVEL: ICD-10-CM

## 2025-02-13 DIAGNOSIS — E29.1 HYPOGONADISM IN MALE: ICD-10-CM

## 2025-02-13 DIAGNOSIS — Z12.5 PROSTATE CANCER SCREENING: ICD-10-CM

## 2025-02-13 DIAGNOSIS — R29.898 WEAKNESS OF BOTH LOWER EXTREMITIES: ICD-10-CM

## 2025-02-13 DIAGNOSIS — R00.0 TACHYCARDIA: ICD-10-CM

## 2025-02-13 DIAGNOSIS — Z76.0 ENCOUNTER FOR MEDICATION REFILL: ICD-10-CM

## 2025-02-13 DIAGNOSIS — E34.9 TESTOSTERONE DEFICIENCY: ICD-10-CM

## 2025-02-13 DIAGNOSIS — E13.9 DIABETES MELLITUS TYPE 1.5: Primary | ICD-10-CM

## 2025-02-13 DIAGNOSIS — N52.9 ERECTILE DYSFUNCTION, UNSPECIFIED ERECTILE DYSFUNCTION TYPE: ICD-10-CM

## 2025-02-13 DIAGNOSIS — E11.40 TYPE 2 DIABETES MELLITUS WITH DIABETIC NEUROPATHY, WITH LONG-TERM CURRENT USE OF INSULIN: ICD-10-CM

## 2025-02-13 DIAGNOSIS — Z79.4 OTHER SPECIFIED DIABETES MELLITUS WITH OTHER SPECIFIED COMPLICATION, WITH LONG-TERM CURRENT USE OF INSULIN: ICD-10-CM

## 2025-02-13 LAB
ACETONE SERPL QL SCN: NEGATIVE
ALBUMIN SERPL BCP-MCNC: 4.1 G/DL (ref 3.5–5)
ALBUMIN/GLOB SERPL: 1.3 {RATIO}
ALP SERPL-CCNC: 105 U/L (ref 40–150)
ALT SERPL W P-5'-P-CCNC: 14 U/L
ANION GAP SERPL CALCULATED.3IONS-SCNC: 15 MMOL/L (ref 7–16)
AST SERPL W P-5'-P-CCNC: 15 U/L (ref 5–34)
BILIRUB SERPL-MCNC: 0.5 MG/DL
BUN SERPL-MCNC: 9 MG/DL (ref 9–21)
BUN/CREAT SERPL: 9 (ref 6–20)
CALCIUM SERPL-MCNC: 9.2 MG/DL (ref 8.4–10.2)
CHLORIDE SERPL-SCNC: 98 MMOL/L (ref 98–107)
CO2 SERPL-SCNC: 26 MMOL/L (ref 22–29)
CREAT SERPL-MCNC: 0.95 MG/DL (ref 0.72–1.25)
CREAT UR-MCNC: 47 MG/DL (ref 23–375)
EGFR (NO RACE VARIABLE) (RUSH/TITUS): 112 ML/MIN/1.73M2
GLOBULIN SER-MCNC: 3.2 G/DL (ref 2–4)
GLUCOSE SERPL-MCNC: 477 MG/DL (ref 70–110)
GLUCOSE SERPL-MCNC: 502 MG/DL (ref 74–100)
KETONES UR STRIP-SCNC: NORMAL MG/DL
MICROALBUMIN UR-MCNC: <0.5 MG/DL
MICROALBUMIN/CREAT RATIO PNL UR: NORMAL
POTASSIUM SERPL-SCNC: 4 MMOL/L (ref 3.5–5.1)
PROT SERPL-MCNC: 7.3 G/DL (ref 6.4–8.3)
SODIUM SERPL-SCNC: 135 MMOL/L (ref 136–145)
T4 FREE SERPL-MCNC: 1.34 NG/DL (ref 0.7–1.48)
TESTOST SERPL-MCNC: 496.8 NG/DL (ref 240.2–870.7)
TSH SERPL DL<=0.005 MIU/L-ACNC: 1 UIU/ML (ref 0.35–4.94)

## 2025-02-13 PROCEDURE — 82570 ASSAY OF URINE CREATININE: CPT | Mod: ,,, | Performed by: CLINICAL MEDICAL LABORATORY

## 2025-02-13 PROCEDURE — 36415 COLL VENOUS BLD VENIPUNCTURE: CPT

## 2025-02-13 PROCEDURE — 84443 ASSAY THYROID STIM HORMONE: CPT | Mod: ,,, | Performed by: CLINICAL MEDICAL LABORATORY

## 2025-02-13 PROCEDURE — 80053 COMPREHEN METABOLIC PANEL: CPT | Mod: ,,, | Performed by: CLINICAL MEDICAL LABORATORY

## 2025-02-13 PROCEDURE — 84403 ASSAY OF TOTAL TESTOSTERONE: CPT | Mod: ,,, | Performed by: CLINICAL MEDICAL LABORATORY

## 2025-02-13 PROCEDURE — 81002 URINALYSIS NONAUTO W/O SCOPE: CPT | Mod: ,,, | Performed by: CLINICAL MEDICAL LABORATORY

## 2025-02-13 PROCEDURE — 99214 OFFICE O/P EST MOD 30 MIN: CPT | Mod: GC,,, | Performed by: FAMILY MEDICINE

## 2025-02-13 PROCEDURE — 82043 UR ALBUMIN QUANTITATIVE: CPT | Mod: ,,, | Performed by: CLINICAL MEDICAL LABORATORY

## 2025-02-13 PROCEDURE — 84439 ASSAY OF FREE THYROXINE: CPT | Mod: ,,, | Performed by: CLINICAL MEDICAL LABORATORY

## 2025-02-13 RX ORDER — INSULIN ASPART 100 [IU]/ML
12 INJECTION, SOLUTION INTRAVENOUS; SUBCUTANEOUS
Qty: 15 ML | Refills: 2 | Status: SHIPPED | OUTPATIENT
Start: 2025-02-13

## 2025-02-13 RX ORDER — INSULIN GLARGINE-YFGN 100 [IU]/ML
60 INJECTION, SOLUTION SUBCUTANEOUS DAILY
Qty: 18 ML | Refills: 3 | Status: SHIPPED | OUTPATIENT
Start: 2025-02-13 | End: 2025-06-13

## 2025-02-13 RX ORDER — PREGABALIN 150 MG/1
150 CAPSULE ORAL 2 TIMES DAILY
Qty: 60 CAPSULE | Refills: 1 | Status: SHIPPED | OUTPATIENT
Start: 2025-02-13

## 2025-02-13 RX ORDER — SILDENAFIL 100 MG/1
50 TABLET, FILM COATED ORAL DAILY PRN
Qty: 30 TABLET | Refills: 2 | Status: SHIPPED | OUTPATIENT
Start: 2025-02-13 | End: 2026-02-13

## 2025-02-13 RX ORDER — ISOPROPYL ALCOHOL 70 ML/100ML
3 SWAB TOPICAL DAILY
Qty: 90 EACH | Refills: 3 | Status: SHIPPED | OUTPATIENT
Start: 2025-02-13 | End: 2025-06-13

## 2025-02-13 RX ORDER — INSULIN ASPART 100 [IU]/ML
12 INJECTION, SOLUTION INTRAVENOUS; SUBCUTANEOUS
Qty: 15 ML | Refills: 2 | Status: SHIPPED | OUTPATIENT
Start: 2025-02-13 | End: 2025-02-13

## 2025-02-13 RX ORDER — PROPRANOLOL HYDROCHLORIDE 10 MG/1
10 TABLET ORAL 2 TIMES DAILY PRN
Qty: 60 TABLET | Refills: 1 | Status: SHIPPED | OUTPATIENT
Start: 2025-02-13

## 2025-02-13 RX ORDER — SYRINGE,SAFETY WITH NEEDLE,1ML 25GX1"
1 SYRINGE (EA) MISCELLANEOUS 4 TIMES DAILY
Qty: 100 EACH | Refills: 11 | Status: SHIPPED | OUTPATIENT
Start: 2025-02-13

## 2025-02-13 RX ORDER — INSULIN PUMP SYRINGE, 3 ML
EACH MISCELLANEOUS
Qty: 1 EACH | Refills: 0 | Status: SHIPPED | OUTPATIENT
Start: 2025-02-13

## 2025-02-13 NOTE — PROGRESS NOTES
Monitor -sugar is 500  Evaluate- trace ketones  Assess -denies any DKA symptoms  Treat -restart insulins tonight tid short acting and long acting    Samantha Gonzales D.O.

## 2025-02-13 NOTE — PROGRESS NOTES
Discussed with pt over phone and in person about critical lab results: Glucose 477 in clinic, 502 from CMP, HCO3 26, AG 15 wnl, no symptoms of DKA. Trace urine ketones but serum ketone negative. Patient advised to go to ED if he develops symptoms such as rapid deep breathing, confusion, lethargy, etc. He expressed understanding (he had an episode of DKA with ICU admission in 2021 when he was first diagnosed with diabetes). He just got money for his insulin and will get it today before pharmacy closes. Followup 1 week, sooner or to ER if needed. Will monitor glucose/home glucose logs closely. See Dr. Gonzales's result note and clinic note. Thank you.

## 2025-02-13 NOTE — PROGRESS NOTES
POC Glucose 477 discussed w/ pt during visit - he was noncompliant for 6-8 months with his insulin. Refilled insulin - see clinic note

## 2025-02-13 NOTE — PROGRESS NOTES
Subjective:       Patient ID: Low Gutierrez is a 29 y.o. male.    Chief Complaint: Medication Refill and Diabetes (Room 5 medicine refills, diabetes)    30yo AA male with a PMH of uncontrolled diabetes 1.5, depression, scoliosis, and chronic back pain who presents to [facility] with diabetic medication refills. The patient reports significant weight loss of 100 lbs since being diagnosed with diabetes in 2021. The patient weighed 156 pounds, which had been stable, but reported a previous weight of 255-265 pounds before the diabetes diagnosis. The patient had a BMI of 19.2, which was within the normal range. Despite stable weight, the patient was concerned about erectile dysfunction, for which the patient had been prescribed Viagra, but reported it was ineffective even at a dose of 100 mg. The patient suspected the erectile dysfunction was related to diabetes and weight loss. Additionally, the patient's blood sugar readings were noted to be high, around 197-210 mg/dL, and the last A1c was 11% in July 2023. The patient also experienced nerve pain in the legs, described as tingling and pain, for which the patient was taking Lyrica. The patient reported being off insulin for six months and had been managing without it, despite blood sugars in the 190-200 range. The patient expressed a desire to restart insulin therapy and was interested in testosterone therapy, as it had helped in the past. The patient also had concerns about weakness in the legs and requested physical therapy.                        Current Outpatient Medications:     lancets Misc, Inject 1 each into the skin once daily., Disp: 100 each, Rfl: 6    multivitamin Tab, Take 1 tablet by mouth once daily., Disp: 90 tablet, Rfl: 1    alcohol swabs PadM, Apply 3 each topically once daily., Disp: 90 each, Rfl: 3    blood sugar diagnostic Strp, 1 strip by Misc.(Non-Drug; Combo Route) route 3 (three) times daily., Disp: 200 strip, Rfl: 3    blood-glucose meter kit,  "Use as instructed, Disp: 1 each, Rfl: 0    insulin aspart U-100 (NOVOLOG) 100 unit/mL (3 mL) InPn pen, Inject 12 Units into the skin 3 (three) times daily with meals., Disp: 15 mL, Rfl: 2    insulin glargine-yfgn 100 unit/mL (3 mL) InPn, Inject 60 Units into the skin once daily., Disp: 18 mL, Rfl: 3    insulin syringe-needle U-100 1 mL 31 gauge x 5/16 Syrg, 1 each by Misc.(Non-Drug; Combo Route) route 4 (four) times daily., Disp: 100 each, Rfl: 11    pen needle, diabetic 32 gauge x 5/32" Ndle, For use with insulin, Disp: , Rfl:     pregabalin (LYRICA) 150 MG capsule, Take 1 capsule (150 mg total) by mouth 2 (two) times daily., Disp: 60 capsule, Rfl: 1    propranoloL (INDERAL) 10 MG tablet, Take 1 tablet (10 mg total) by mouth 2 (two) times daily as needed (tachycardia)., Disp: 60 tablet, Rfl: 1    sildenafiL (VIAGRA) 100 MG tablet, Take 0.5 tablets (50 mg total) by mouth daily as needed for Erectile Dysfunction., Disp: 30 tablet, Rfl: 2    testosterone cypionate (DEPOTESTOTERONE CYPIONATE) 100 mg/mL injection, Inject 2 mLs (200 mg total) into the muscle every 14 (fourteen) days. (Patient not taking: Reported on 2/13/2025), Disp: 10 mL, Rfl: 1    Review of patient's allergies indicates:   Allergen Reactions    Wasp venom Swelling       Past Medical History:   Diagnosis Date    Diabetes mellitus, type 2        Past Surgical History:   Procedure Laterality Date    right arm surgery      at birth       History reviewed. No pertinent family history.    Social History     Tobacco Use    Smoking status: Every Day     Types: Cigarettes    Smokeless tobacco: Never   Substance Use Topics    Alcohol use: Yes     Comment: occasionally    Drug use: Yes     Types: Marijuana     Comment: 1 day ago use       Review of Systems   Constitutional:  Negative for appetite change, chills and fever.   HENT:  Negative for trouble swallowing.    Eyes:  Negative for visual disturbance.   Respiratory:  Negative for shortness of breath.  " "  Cardiovascular:  Negative for chest pain.   Gastrointestinal:  Negative for abdominal pain, diarrhea, nausea and vomiting.   Endocrine: Positive for polydipsia and polyuria.   Genitourinary:  Negative for difficulty urinating and dysuria.   Musculoskeletal:  Negative for joint deformity.   Integumentary:  Negative for wound.   Neurological:  Positive for weakness (LE). Negative for syncope.   Psychiatric/Behavioral:  Negative for sleep disturbance.          Current Medications:   Medication List with Changes/Refills   Current Medications    LANCETS MISC    Inject 1 each into the skin once daily.       Start Date: 4/5/2023  End Date: --    MULTIVITAMIN TAB    Take 1 tablet by mouth once daily.       Start Date: 7/20/2023 End Date: --    PEN NEEDLE, DIABETIC 32 GAUGE X 5/32" NDLE    For use with insulin       Start Date: 9/5/2023  End Date: --    TESTOSTERONE CYPIONATE (DEPOTESTOTERONE CYPIONATE) 100 MG/ML INJECTION    Inject 2 mLs (200 mg total) into the muscle every 14 (fourteen) days.       Start Date: 8/3/2023  End Date: --   Changed and/or Refilled Medications    Modified Medication Previous Medication    ALCOHOL SWABS PADM alcohol swabs PadM       Apply 3 each topically once daily.    For use with insulin       Start Date: 2/13/2025 End Date: 6/13/2025    Start Date: 9/5/2023  End Date: 2/13/2025    BLOOD SUGAR DIAGNOSTIC STRP blood sugar diagnostic Strp       1 strip by Misc.(Non-Drug; Combo Route) route 3 (three) times daily.    1 strip by Misc.(Non-Drug; Combo Route) route 3 (three) times daily.       Start Date: 2/13/2025 End Date: --    Start Date: 4/5/2023  End Date: 2/13/2025    BLOOD-GLUCOSE METER KIT blood-glucose meter kit       Use as instructed    Use as instructed       Start Date: 2/13/2025 End Date: --    Start Date: 4/5/2023  End Date: 2/13/2025    INSULIN ASPART U-100 (NOVOLOG) 100 UNIT/ML (3 ML) INPN PEN insulin aspart U-100 (NOVOLOG) 100 unit/mL (3 mL) InPn pen       Inject 12 Units into the " skin 3 (three) times daily with meals.    Inject 12 Units into the skin 3 (three) times daily with meals.       Start Date: 2/13/2025 End Date: --    Start Date: 3/27/2023 End Date: 2/13/2025    INSULIN GLARGINE-YFGN 100 UNIT/ML (3 ML) INPN insulin glargine U-100, Lantus, (BASAGLAR KWIKPEN U-100 INSULIN) 100 unit/mL (3 mL) InPn pen       Inject 60 Units into the skin once daily.    Inject 60 Units into the skin once daily.       Start Date: 2/13/2025 End Date: 6/13/2025    Start Date: 9/5/2023  End Date: 2/13/2025    INSULIN SYRINGE-NEEDLE U-100 1 ML 31 GAUGE X 5/16 SYRG insulin syringe-needle U-100 1 mL 31 gauge x 5/16 Syrg       1 each by Misc.(Non-Drug; Combo Route) route 4 (four) times daily.    1 each by Misc.(Non-Drug; Combo Route) route 4 (four) times daily.       Start Date: 2/13/2025 End Date: --    Start Date: 1/4/2023  End Date: 2/13/2025    PREGABALIN (LYRICA) 150 MG CAPSULE pregabalin (LYRICA) 150 MG capsule       Take 1 capsule (150 mg total) by mouth 2 (two) times daily.    Take 1 capsule (150 mg total) by mouth 2 (two) times daily.       Start Date: 2/13/2025 End Date: --    Start Date: 3/27/2023 End Date: 2/13/2025    PROPRANOLOL (INDERAL) 10 MG TABLET propranoloL (INDERAL) 10 MG tablet       Take 1 tablet (10 mg total) by mouth 2 (two) times daily as needed (tachycardia).    Take 1 tablet (10 mg total) by mouth 2 (two) times daily.       Start Date: 2/13/2025 End Date: --    Start Date: 7/20/2023 End Date: 2/13/2025    SILDENAFIL (VIAGRA) 100 MG TABLET sildenafiL (VIAGRA) 100 MG tablet       Take 0.5 tablets (50 mg total) by mouth daily as needed for Erectile Dysfunction.    Take 0.5 tablets (50 mg total) by mouth daily as needed for Erectile Dysfunction.       Start Date: 2/13/2025 End Date: 2/13/2026    Start Date: 1/18/2023 End Date: 2/13/2025   Discontinued Medications    DOCUSATE SODIUM (COLACE) 100 MG CAPSULE    Take 1 capsule (100 mg total) by mouth 2 (two) times daily.       Start Date:  "6/26/2023 End Date: 2/13/2025    ESCITALOPRAM OXALATE (LEXAPRO) 10 MG TABLET    Take 1 tablet (10 mg total) by mouth once daily.       Start Date: 1/5/2022  End Date: 2/13/2025    GENTAMICIN (GARAMYCIN) 0.3 % OPHTHALMIC SOLUTION    Place 2 drops into the right eye 4 (four) times daily.       Start Date: 8/3/2023  End Date: 2/13/2025    INSULIN DETEMIR U-100, LEVEMIR, (LEVEMIR FLEXTOUCH U100 INSULIN) 100 UNIT/ML (3 ML) INPN PEN    Inject 25 Units into the skin 2 (two) times daily.       Start Date: 8/3/2023  End Date: 2/13/2025    MULTIVITAMIN (THERAGRAN) PER TABLET    Take 1 tablet by mouth once daily.       Start Date: 3/27/2023 End Date: 2/13/2025    POLYETHYLENE GLYCOL (GLYCOLAX) 17 GRAM PWPK    Take 17 g by mouth once daily.       Start Date: 6/26/2023 End Date: 2/13/2025    TRAMADOL (ULTRAM) 50 MG TABLET    Take 1 tablet (50 mg total) by mouth every 12 (twelve) hours as needed for Pain.       Start Date: 3/27/2023 End Date: 2/13/2025            Objective:        Vitals:    02/13/25 1035   BP: 116/83   BP Location: Left arm   Patient Position: Sitting   Pulse: 108   Resp: 18   Temp: 98.2 °F (36.8 °C)   TempSrc: Oral   SpO2: 98%   Weight: 71.1 kg (156 lb 12.8 oz)   Height: 6' 4" (1.93 m)       Physical Exam  Vitals and nursing note reviewed.   Constitutional:       General: He is not in acute distress.     Appearance: He is ill-appearing. He is not toxic-appearing or diaphoretic.   HENT:      Head: Normocephalic and atraumatic.      Right Ear: External ear normal.      Left Ear: External ear normal.      Nose: Nose normal.   Eyes:      General: No scleral icterus.        Right eye: No discharge.         Left eye: No discharge.      Conjunctiva/sclera: Conjunctivae normal.      Pupils: Pupils are equal, round, and reactive to light.   Cardiovascular:      Rate and Rhythm: Normal rate and regular rhythm.      Pulses: Normal pulses.      Heart sounds: Normal heart sounds. No murmur heard.  Pulmonary:      Effort: " Pulmonary effort is normal. No respiratory distress.      Breath sounds: Normal breath sounds. No wheezing, rhonchi or rales.   Abdominal:      General: There is no distension.   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 No swelling or tenderness.      Cervical back: Neck supple.      Right lower leg: No edema.      Left lower leg: No edema.   Skin:     General: Skin is warm and dry.      Coloration: Skin is not jaundiced.      Findings: Lesion (left 2nd toe lesion 2/2 diabetes, no signs of infection, no bleeding or erythema, no ucler) present.   Neurological:      Mental Status: He is alert.      Sensory: No sensory deficit.      Gait: Gait normal.   Psychiatric:         Mood and Affect: Mood normal.         Behavior: Behavior normal.               Lab Results   Component Value Date    WBC 6.25 07/19/2023    HGB 15.2 07/19/2023    HCT 44.4 07/19/2023     07/19/2023    CHOL 196 01/04/2023    TRIG 129 01/04/2023    HDL 51 01/04/2023    ALT 13 (L) 03/23/2023    AST 5 (L) 03/23/2023     (L) 07/19/2023    K 4.0 07/19/2023    CL 97 (L) 07/19/2023    CREATININE 0.77 07/19/2023    BUN 11 07/19/2023    CO2 31 07/19/2023    TSH 0.023 (L) 06/26/2023    HGBA1C 11.5 (H) 07/19/2023    MICROALBUR 2.0 03/23/2023      Assessment:       1. Diabetes mellitus type 1.5    2. Other specified diabetes mellitus with other specified complication, with long-term current use of insulin    3. Type 2 diabetes mellitus with diabetic neuropathy, with long-term current use of insulin    4. Testosterone deficiency    5. Encounter for medication refill    6. Erectile dysfunction, unspecified erectile dysfunction type    7. Weakness of both lower extremities    8. Prostate cancer screening    9. Hypogonadism in male        Plan:       ASSESSMENT:    1. Diabetes Mellitus: The patient had a history of diabetes with poorly controlled blood sugar levels, as  evidenced by an A1c of 11% in the last test. The patient was off insulin for six months - medication noncompliance, leading to elevated blood sugar levels. Restarting insulin therapy was necessary to manage the condition effectively.    2. Erectile Dysfunction: The patient experienced erectile dysfunction that may be related to diabetes or weight loss. The patient reported Viagra was ineffective at the maximum dose, suggesting a possible need for alternative treatments or further evaluation of underlying causes.    3. Peripheral Neuropathy: The patient reported nerve pain and tingling in the legs, likely secondary to diabetes. The patient was taking Lyrica for symptom management but reported persistent symptoms, indicating a need for dose adjustment or alternative therapies.    PLAN:    Treatment:  - Restarted insulin therapy to manage blood glucose levels - glargine 60 U qd, aspart 12 U tidwm, diabetic supplies  - ordered urine labs but not done by lab - will get them done next visist  - Continued Lyrica for neuropathy and considered increasing the dose.  - Considered propranolol if heart rate remained elevated at the next visit.    Tests:  - Ordered blood draw for A1c, CMP, serum acetone to assess current diabetes control.  - Planned blood draw for testosterone levels.  - POC glucose 477 - treating with insulin above    Patient Education:  - Advised on the importance of consistent meal intake and monitoring blood sugar levels.  - Encouraged to maintain a low-sugar diet.    Follow-Up:  - Planned follow-up appointment in one week to review blood test results and adjust treatment as necessary.    Disposition:  - Referred to endocrinology Dr. De La Rosa in [place], [place] for specialized diabetes management.  - Referred to physical therapy to address leg weakness and improve mobility.  - referred to ophthalmology for diabetic eye exam    Problem List Items Addressed This Visit          Renal/    Erectile dysfunction  (Chronic)    Relevant Medications    sildenafiL (VIAGRA) 100 MG tablet       Endocrine    Diabetes mellitus    Relevant Medications    alcohol swabs PadM    insulin glargine-yfgn 100 unit/mL (3 mL) InPn    pregabalin (LYRICA) 150 MG capsule    insulin syringe-needle U-100 1 mL 31 gauge x 5/16 Syrg    insulin aspart U-100 (NOVOLOG) 100 unit/mL (3 mL) InPn pen    Testosterone deficiency    Relevant Orders    Testosterone    Diabetes mellitus type 1.5 - Primary    Relevant Medications    alcohol swabs PadM    blood sugar diagnostic Strp    insulin glargine-yfgn 100 unit/mL (3 mL) InPn    blood-glucose meter kit    insulin aspart U-100 (NOVOLOG) 100 unit/mL (3 mL) InPn pen    Other Relevant Orders    Hemoglobin A1C    Comprehensive Metabolic Panel    POCT glucose (Completed)    Ambulatory referral/consult to Endocrinology    Microalbumin/Creatinine Ratio, Urine    Ambulatory referral/consult to Ophthalmology    Acetone, serum    Ketones Urine       Other    Encounter for medication refill    Relevant Medications    alcohol swabs PadM    blood sugar diagnostic Strp    insulin glargine-yfgn 100 unit/mL (3 mL) InPn    sildenafiL (VIAGRA) 100 MG tablet    propranoloL (INDERAL) 10 MG tablet    insulin aspart U-100 (NOVOLOG) 100 unit/mL (3 mL) InPn pen    Other Relevant Orders    Testosterone     Other Visit Diagnoses       Weakness of both lower extremities        Relevant Orders    Ambulatory Referral/Consult to Physical/Occupational Therapy    Prostate cancer screening        Hypogonadism in male                  Follow up in about 1 week (around 2/20/2025) for uncontrolled diabetes.    Yazan Lyman DO     Instructed patient that if symptoms fail to improve or worsen patient should seek immediate medical attention or report to the nearest emergency department. Patient expressed verbal agreement and understanding to this plan of care.

## 2025-02-15 LAB — HBA1C MFR BLD: 14.3 % (ref 4–5.6)

## 2025-02-20 ENCOUNTER — OFFICE VISIT (OUTPATIENT)
Dept: FAMILY MEDICINE | Facility: CLINIC | Age: 30
End: 2025-02-20
Payer: MEDICARE

## 2025-02-20 VITALS
DIASTOLIC BLOOD PRESSURE: 75 MMHG | RESPIRATION RATE: 19 BRPM | TEMPERATURE: 98 F | HEIGHT: 76 IN | OXYGEN SATURATION: 99 % | SYSTOLIC BLOOD PRESSURE: 112 MMHG | BODY MASS INDEX: 20.95 KG/M2 | HEART RATE: 90 BPM | WEIGHT: 172 LBS

## 2025-02-20 DIAGNOSIS — Z79.4 TYPE 2 DIABETES MELLITUS WITH DIABETIC NEUROPATHY, WITH LONG-TERM CURRENT USE OF INSULIN: Primary | ICD-10-CM

## 2025-02-20 DIAGNOSIS — E11.40 TYPE 2 DIABETES MELLITUS WITH DIABETIC NEUROPATHY, WITH LONG-TERM CURRENT USE OF INSULIN: Primary | ICD-10-CM

## 2025-02-20 DIAGNOSIS — L84 CALLUS OF TOE: ICD-10-CM

## 2025-02-20 PROBLEM — K04.7 DENTAL ABSCESS: Status: RESOLVED | Noted: 2021-06-07 | Resolved: 2025-02-20

## 2025-02-20 RX ORDER — PREGABALIN 150 MG/1
150 CAPSULE ORAL 2 TIMES DAILY
Qty: 60 CAPSULE | Refills: 1 | Status: SHIPPED | OUTPATIENT
Start: 2025-02-20

## 2025-02-20 NOTE — PROGRESS NOTES
"Subjective:       Patient ID: Low Gutierrez is a 29 y.o. male.    Chief Complaint: Follow-up (X1 week follow up )    28yo AA male with a PMH of uncontrolled diabetes 1.5, depression, scoliosis, and chronic back pain who presents to [facility] with 1-week f/u for his diabetes. The patient reported a significant improvement in his blood sugar levels since resuming insulin therapy. Previously, the patient had stopped insulin for six months, leading to a high A1c of 14 and a point-of-care blood sugar reading of 500 last week. With the resumption of insulin, the patient's blood sugar levels ranged from 130 to 200. The patient was initially taking Glargine 60 units daily and Aspart 12 units tid. He experienced blood sugar lows in the past when taking Aspart 12 units tid, leading to a reduction to twice daily dosages. The patient was concerned about "tanking" or having low blood sugar episodes. He also reported gaining 16 pounds since restarting insulin, resolving his previous erectile dysfunction, and an improved heart rate from 108 to 90. He mentioned that his current regimen worked well but expressed concern about taking Aspart 12 U three times a day. The patient had not yet scheduled an appointment with his endocrinologist, but the office had called him and appointment is pending. He was referred to ophthalmology for an eye exam, which he had not yet received a call for. The patient reported foot calluses and was advised to follow up with me and Dr. Thompson for callus shaving in about 1 week. He mentioned having some sensory issues in his feet but was generally managing well. Lyrica wasn't filled last week and given today.                  Current Outpatient Medications:     alcohol swabs PadM, Apply 3 each topically once daily., Disp: 90 each, Rfl: 3    blood sugar diagnostic Strp, 1 strip by Misc.(Non-Drug; Combo Route) route 3 (three) times daily., Disp: 200 strip, Rfl: 3    blood-glucose meter kit, Use as instructed, " "Disp: 1 each, Rfl: 0    insulin aspart U-100 (NOVOLOG) 100 unit/mL (3 mL) InPn pen, Inject 12 Units into the skin 3 (three) times daily with meals., Disp: 15 mL, Rfl: 2    insulin glargine-yfgn 100 unit/mL (3 mL) InPn, Inject 60 Units into the skin once daily., Disp: 18 mL, Rfl: 3    insulin syringe-needle U-100 1 mL 31 gauge x 5/16 Syrg, 1 each by Misc.(Non-Drug; Combo Route) route 4 (four) times daily., Disp: 100 each, Rfl: 11    lancets Misc, Inject 1 each into the skin once daily., Disp: 100 each, Rfl: 6    multivitamin Tab, Take 1 tablet by mouth once daily., Disp: 90 tablet, Rfl: 1    pen needle, diabetic 32 gauge x 5/32" Ndle, For use with insulin, Disp: , Rfl:     propranoloL (INDERAL) 10 MG tablet, Take 1 tablet (10 mg total) by mouth 2 (two) times daily as needed (tachycardia)., Disp: 60 tablet, Rfl: 1    sildenafiL (VIAGRA) 100 MG tablet, Take 0.5 tablets (50 mg total) by mouth daily as needed for Erectile Dysfunction., Disp: 30 tablet, Rfl: 2    pregabalin (LYRICA) 150 MG capsule, Take 1 capsule (150 mg total) by mouth 2 (two) times daily., Disp: 60 capsule, Rfl: 1    Review of patient's allergies indicates:   Allergen Reactions    Wasp venom Swelling       Past Medical History:   Diagnosis Date    Diabetes mellitus, type 2        Past Surgical History:   Procedure Laterality Date    right arm surgery      at birth       History reviewed. No pertinent family history.    Social History     Tobacco Use    Smoking status: Every Day     Types: Cigarettes    Smokeless tobacco: Never   Substance Use Topics    Alcohol use: Yes     Comment: occasionally    Drug use: Yes     Types: Marijuana     Comment: 1 day ago use       Review of Systems   Constitutional:  Negative for appetite change, chills and fever.   HENT:  Negative for trouble swallowing.    Eyes:  Negative for visual disturbance.   Respiratory:  Negative for shortness of breath.    Cardiovascular:  Negative for chest pain.   Gastrointestinal:  Negative " "for abdominal pain, diarrhea, nausea and vomiting.   Genitourinary:  Negative for difficulty urinating and dysuria.   Integumentary:  Positive for mole/lesion (toe calluses). Negative for wound.   Neurological:  Negative for dizziness, syncope, weakness (LE) and light-headedness.   Psychiatric/Behavioral:  Negative for sleep disturbance.          Current Medications:   Medication List with Changes/Refills   Current Medications    ALCOHOL SWABS PADM    Apply 3 each topically once daily.       Start Date: 2/13/2025 End Date: 6/13/2025    BLOOD SUGAR DIAGNOSTIC STRP    1 strip by Misc.(Non-Drug; Combo Route) route 3 (three) times daily.       Start Date: 2/13/2025 End Date: --    BLOOD-GLUCOSE METER KIT    Use as instructed       Start Date: 2/13/2025 End Date: --    INSULIN ASPART U-100 (NOVOLOG) 100 UNIT/ML (3 ML) INPN PEN    Inject 12 Units into the skin 3 (three) times daily with meals.       Start Date: 2/13/2025 End Date: --    INSULIN GLARGINE-YFGN 100 UNIT/ML (3 ML) INPN    Inject 60 Units into the skin once daily.       Start Date: 2/13/2025 End Date: 6/13/2025    INSULIN SYRINGE-NEEDLE U-100 1 ML 31 GAUGE X 5/16 SYRG    1 each by Misc.(Non-Drug; Combo Route) route 4 (four) times daily.       Start Date: 2/13/2025 End Date: --    LANCETS MISC    Inject 1 each into the skin once daily.       Start Date: 4/5/2023  End Date: --    MULTIVITAMIN TAB    Take 1 tablet by mouth once daily.       Start Date: 7/20/2023 End Date: --    PEN NEEDLE, DIABETIC 32 GAUGE X 5/32" NDLE    For use with insulin       Start Date: 9/5/2023  End Date: --    PROPRANOLOL (INDERAL) 10 MG TABLET    Take 1 tablet (10 mg total) by mouth 2 (two) times daily as needed (tachycardia).       Start Date: 2/13/2025 End Date: --    SILDENAFIL (VIAGRA) 100 MG TABLET    Take 0.5 tablets (50 mg total) by mouth daily as needed for Erectile Dysfunction.       Start Date: 2/13/2025 End Date: 2/13/2026   Changed and/or Refilled Medications    Modified " "Medication Previous Medication    PREGABALIN (LYRICA) 150 MG CAPSULE pregabalin (LYRICA) 150 MG capsule       Take 1 capsule (150 mg total) by mouth 2 (two) times daily.    Take 1 capsule (150 mg total) by mouth 2 (two) times daily.       Start Date: 2/20/2025 End Date: --    Start Date: 2/13/2025 End Date: 2/20/2025   Discontinued Medications    TESTOSTERONE CYPIONATE (DEPOTESTOTERONE CYPIONATE) 100 MG/ML INJECTION    Inject 2 mLs (200 mg total) into the muscle every 14 (fourteen) days.       Start Date: 8/3/2023  End Date: 2/20/2025            Objective:        Vitals:    02/20/25 0842   BP: 112/75   BP Location: Left arm   Patient Position: Sitting   Pulse: 90   Resp: 19   Temp: 98.2 °F (36.8 °C)   TempSrc: Oral   SpO2: 99%   Weight: 78 kg (172 lb)   Height: 6' 4" (1.93 m)       Physical Exam  Vitals and nursing note reviewed.   Constitutional:       General: He is not in acute distress.     Appearance: He is not ill-appearing, toxic-appearing or diaphoretic.   HENT:      Head: Normocephalic and atraumatic.      Right Ear: External ear normal.      Left Ear: External ear normal.      Nose: Nose normal. No congestion.   Eyes:      General: No scleral icterus.        Right eye: No discharge.         Left eye: No discharge.   Cardiovascular:      Rate and Rhythm: Normal rate and regular rhythm.      Pulses: Normal pulses.      Heart sounds: Normal heart sounds. No murmur heard.  Pulmonary:      Effort: Pulmonary effort is normal. No respiratory distress.      Breath sounds: Normal breath sounds. No wheezing, rhonchi or rales.   Abdominal:      General: Bowel sounds are normal. There is no distension.   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 No swelling or tenderness.      Cervical back: Neck supple.      Right lower leg: No edema.      Left lower leg: No edema.   Skin:     General: Skin is warm and dry.      Coloration: Skin is not " jaundiced.      Findings: Lesion (bilateral big toe calluses) present.   Neurological:      Mental Status: He is alert.      Sensory: No sensory deficit.      Gait: Gait normal.   Psychiatric:         Mood and Affect: Mood normal.         Behavior: Behavior normal.               Lab Results   Component Value Date    WBC 6.25 07/19/2023    HGB 15.2 07/19/2023    HCT 44.4 07/19/2023     07/19/2023    CHOL 196 01/04/2023    TRIG 129 01/04/2023    HDL 51 01/04/2023    ALT 14 02/13/2025    AST 15 02/13/2025     (L) 02/13/2025    K 4.0 02/13/2025    CL 98 02/13/2025    CREATININE 0.95 02/13/2025    BUN 9 02/13/2025    CO2 26 02/13/2025    TSH 1.005 02/13/2025    HGBA1C 11.5 (H) 07/19/2023    MICROALBUR <0.5 02/13/2025      Assessment:       1. Type 2 diabetes mellitus with diabetic neuropathy, with long-term current use of insulin    2. Callus of toe        Plan:       ASSESSMENT:    1. Poorly Controlled Diabetes Mellitus: The patient's history of high A1c at 14 and blood glucose levels suggest poorly controlled diabetes, which has shown improvement with insulin therapy. The recent adjustments in insulin dosage and the plan to titrate Aspart should help stabilize his blood sugar levels further.       2. Diabetic Neuropathy: The sensory issues in the patient's feet may be indicative of diabetic neuropathy, a common complication of diabetes. Continued monitoring and management of blood sugar levels may help alleviate these symptoms.     3. Calluses on Feet: The presence of calluses may be related to the patient's diabetes.     PLAN:    Treatment:  - Continued insulin therapy with Glargine 60 units daily.  - Adjusted Aspart dosage to 8 units three times daily with meals, with plans to titrate up to 12 units if necessary based on blood sugar readings, diabetic poc glucose goal at 140-180 for now.  - pt has insulin and diabetic supplies that last until June this year.    Tests:  - Referral to ophthalmology for an  eye examination, referral pending per referral nurse.  - Scheduled follow-up appointment with Dr. Thompson and me for foot callus management and shaving.    Patient Education:  - Educated the patient on the importance of not skipping meals and monitoring blood sugar levels regularly.  - Advised on recognizing symptoms of hypoglycemia and appropriate actions to take.    Follow-Up:  - Scheduled follow-up with Dr. Thompson and me in one week for callus management.  - Advised the patient to see the endocrinologist as soon as possible for further diabetes management.  - Recommended a follow-up appointment in one month to assess progress and make any necessary adjustments to the treatment plan.    Disposition:  - The patient was advised to maintain compliance with medication and follow up with recommended specialists to ensure continued improvement in diabetes management.    Problem List Items Addressed This Visit          Derm    Callus of toe       Endocrine    Diabetes mellitus - Primary    Relevant Medications    pregabalin (LYRICA) 150 MG capsule         Follow up in about 1 week (around 2/27/2025) for toe callus shaving, then 1 month for DM2.    Yazan Lyman DO     Instructed patient that if symptoms fail to improve or worsen patient should seek immediate medical attention or report to the nearest emergency department. Patient expressed verbal agreement and understanding to this plan of care.

## 2025-02-25 ENCOUNTER — OFFICE VISIT (OUTPATIENT)
Dept: FAMILY MEDICINE | Facility: CLINIC | Age: 30
End: 2025-02-25
Payer: MEDICARE

## 2025-02-25 VITALS
DIASTOLIC BLOOD PRESSURE: 70 MMHG | WEIGHT: 167 LBS | BODY MASS INDEX: 20.34 KG/M2 | RESPIRATION RATE: 18 BRPM | SYSTOLIC BLOOD PRESSURE: 111 MMHG | HEIGHT: 76 IN | TEMPERATURE: 98 F | OXYGEN SATURATION: 95 % | HEART RATE: 98 BPM

## 2025-02-25 DIAGNOSIS — L60.2 LONG TOENAIL: ICD-10-CM

## 2025-02-25 DIAGNOSIS — L84 CALLUS OF TOE: Primary | ICD-10-CM

## 2025-02-25 NOTE — PROGRESS NOTES
Subjective:       Patient ID: Low Gutierrez is a 29 y.o. male.    Chief Complaint: Follow-up (X1 week )    30yo AA male with a PMH of uncontrolled diabetes 1.5, depression, scoliosis, and chronic back pain who presents to [facility] with to toe calluses and long toenails. The patient presented with calluses on bilateral big toes that had been present for at least 10 years. The patient reported having played football in the past when the symptoms started. There was no reported bleeding or significant pain associated with the lesion. The patient expressed concern that one lesion seemed worse.            Current Medications[1]    Review of patient's allergies indicates:   Allergen Reactions    Wasp venom Swelling       Past Medical History:   Diagnosis Date    Diabetes mellitus, type 2        Past Surgical History:   Procedure Laterality Date    right arm surgery      at birth       No family history on file.    Social History[2]    Review of Systems   Constitutional:  Negative for activity change, appetite change, chills and fever.   Eyes:  Negative for visual disturbance.   Integumentary:  Positive for mole/lesion. Negative for wound.        Big toe calluses and long toenails   Neurological:  Negative for syncope and weakness.         Current Medications:   Medication List with Changes/Refills   Current Medications    ALCOHOL SWABS PADM    Apply 3 each topically once daily.       Start Date: 2/13/2025 End Date: 6/13/2025    BLOOD SUGAR DIAGNOSTIC STRP    1 strip by Misc.(Non-Drug; Combo Route) route 3 (three) times daily.       Start Date: 2/13/2025 End Date: --    BLOOD-GLUCOSE METER KIT    Use as instructed       Start Date: 2/13/2025 End Date: --    INSULIN ASPART U-100 (NOVOLOG) 100 UNIT/ML (3 ML) INPN PEN    Inject 12 Units into the skin 3 (three) times daily with meals.       Start Date: 2/13/2025 End Date: --    INSULIN GLARGINE-YFGN 100 UNIT/ML (3 ML) INPN    Inject 60 Units into the skin once daily.        "Start Date: 2/13/2025 End Date: 6/13/2025    INSULIN SYRINGE-NEEDLE U-100 1 ML 31 GAUGE X 5/16 SYRG    1 each by Misc.(Non-Drug; Combo Route) route 4 (four) times daily.       Start Date: 2/13/2025 End Date: --    LANCETS MISC    Inject 1 each into the skin once daily.       Start Date: 4/5/2023  End Date: --    MULTIVITAMIN TAB    Take 1 tablet by mouth once daily.       Start Date: 7/20/2023 End Date: --    PEN NEEDLE, DIABETIC 32 GAUGE X 5/32" NDLE    For use with insulin       Start Date: 9/5/2023  End Date: --    PREGABALIN (LYRICA) 150 MG CAPSULE    Take 1 capsule (150 mg total) by mouth 2 (two) times daily.       Start Date: 2/20/2025 End Date: --    PROPRANOLOL (INDERAL) 10 MG TABLET    Take 1 tablet (10 mg total) by mouth 2 (two) times daily as needed (tachycardia).       Start Date: 2/13/2025 End Date: --    SILDENAFIL (VIAGRA) 100 MG TABLET    Take 0.5 tablets (50 mg total) by mouth daily as needed for Erectile Dysfunction.       Start Date: 2/13/2025 End Date: 2/13/2026            Objective:        Vitals:    02/25/25 1352   BP: 111/70   BP Location: Left arm   Patient Position: Sitting   Pulse: 98   Resp: 18   Temp: 98.4 °F (36.9 °C)   TempSrc: Oral   SpO2: 95%   Weight: 75.8 kg (167 lb)   Height: 6' 4" (1.93 m)       Physical Exam  Vitals and nursing note reviewed.   Constitutional:       General: He is not in acute distress.     Appearance: Normal appearance. He is not ill-appearing, toxic-appearing or diaphoretic.   HENT:      Head: Normocephalic and atraumatic.      Right Ear: External ear normal.      Left Ear: External ear normal.      Nose: Nose normal.   Eyes:      General: No scleral icterus.        Right eye: No discharge.         Left eye: No discharge.   Cardiovascular:      Rate and Rhythm: Normal rate.   Pulmonary:      Effort: Pulmonary effort is normal. No respiratory distress.   Abdominal:      General: There is no distension.   Musculoskeletal:         General: No swelling.      " Cervical back: Neck supple.        Feet:       Comments: Right hand contracted with history of erb's palsy since birth   Skin:     General: Skin is warm and dry.      Coloration: Skin is not jaundiced.      Findings: Lesion present. No rash.      Comments: Big toe calluses and long toenails     Neurological:      Mental Status: He is alert.      Sensory: No sensory deficit.      Gait: Gait normal.   Psychiatric:         Mood and Affect: Mood normal.         Behavior: Behavior normal.         Protective Sensation (w/ 10 gram monofilament):  Right: Intact at site 1-10 in diagram  Left: Intact at site 1-10 in diagram    Visual Inspection:  Callus -  Bilateral and big toes  with darkening spots on bottom of feet bilaterally 2/2 DM2    Pedal Pulses:   Right: Present  Left: Present          Lab Results   Component Value Date    WBC 6.25 07/19/2023    HGB 15.2 07/19/2023    HCT 44.4 07/19/2023     07/19/2023    CHOL 196 01/04/2023    TRIG 129 01/04/2023    HDL 51 01/04/2023    ALT 14 02/13/2025    AST 15 02/13/2025     (L) 02/13/2025    K 4.0 02/13/2025    CL 98 02/13/2025    CREATININE 0.95 02/13/2025    BUN 9 02/13/2025    CO2 26 02/13/2025    TSH 1.005 02/13/2025    HGBA1C 11.5 (H) 07/19/2023    MICROALBUR <0.5 02/13/2025      Assessment:       1. Callus of toe    2. Long toenail        Plan:       ASSESSMENT:    1. Toe callus: The primary concern is a calluses in bilateral big toes    2. Long toenails: all 10 toes    PLAN:    Treatment:  - Dremel rotary tool for callus shaving  - toenails were trimmed by Dr. Thompson in clinic    Patient Education:  - apply lotion, clean and dry feet daily, check for wounds/lesions daily. Wear shoes for protection at all times.    Follow-Up:  -  f/u 1 month with me for DM2     Disposition:  - pt tolerated callus shaving and nails trimming well  - f/u 1 month for DM2    Problem List Items Addressed This Visit          Derm    Callus of toe - Primary    Relevant Orders    Foot  "Care    Long toenail    Relevant Orders    Foot Care         Follow up in about 1 month (around 3/25/2025) for DM2.    Yazan Lyman DO     Instructed patient that if symptoms fail to improve or worsen patient should seek immediate medical attention or report to the nearest emergency department. Patient expressed verbal agreement and understanding to this plan of care.            [1]   Current Outpatient Medications:     alcohol swabs PadM, Apply 3 each topically once daily., Disp: 90 each, Rfl: 3    blood sugar diagnostic Strp, 1 strip by Misc.(Non-Drug; Combo Route) route 3 (three) times daily., Disp: 200 strip, Rfl: 3    blood-glucose meter kit, Use as instructed, Disp: 1 each, Rfl: 0    insulin aspart U-100 (NOVOLOG) 100 unit/mL (3 mL) InPn pen, Inject 12 Units into the skin 3 (three) times daily with meals., Disp: 15 mL, Rfl: 2    insulin glargine-yfgn 100 unit/mL (3 mL) InPn, Inject 60 Units into the skin once daily., Disp: 18 mL, Rfl: 3    insulin syringe-needle U-100 1 mL 31 gauge x 5/16 Syrg, 1 each by Misc.(Non-Drug; Combo Route) route 4 (four) times daily., Disp: 100 each, Rfl: 11    lancets Misc, Inject 1 each into the skin once daily., Disp: 100 each, Rfl: 6    multivitamin Tab, Take 1 tablet by mouth once daily., Disp: 90 tablet, Rfl: 1    pen needle, diabetic 32 gauge x 5/32" Ndle, For use with insulin, Disp: , Rfl:     pregabalin (LYRICA) 150 MG capsule, Take 1 capsule (150 mg total) by mouth 2 (two) times daily., Disp: 60 capsule, Rfl: 1    propranoloL (INDERAL) 10 MG tablet, Take 1 tablet (10 mg total) by mouth 2 (two) times daily as needed (tachycardia)., Disp: 60 tablet, Rfl: 1    sildenafiL (VIAGRA) 100 MG tablet, Take 0.5 tablets (50 mg total) by mouth daily as needed for Erectile Dysfunction., Disp: 30 tablet, Rfl: 2  [2]   Social History  Tobacco Use    Smoking status: Every Day     Types: Cigarettes    Smokeless tobacco: Never   Substance Use Topics    Alcohol use: Yes     Comment: " occasionally    Drug use: Yes     Types: Marijuana     Comment: 1 day ago use

## 2025-02-25 NOTE — PROGRESS NOTES
Foot Care    Date/Time: 2/25/2025 1:40 PM    Performed by: Yazan Lyman DO  Authorized by: Segun Thompson MD    Consent Done?:  Yes (Verbal)  Hyperkeratotic Skin Lesions?: Yes    Number of trimmed lesions:  2 (with dremel rotary tool)  Location(s):  Left 1st Toe and Right 1st Toe    Nail Care Type:  Trim  Location(s): All  (Left 1st Toe, Left 3rd Toe, Left 2nd Toe, Left 4th Toe, Left 5th Toe, Right 1st Toe, Right 2nd Toe, Right 3rd Toe, Right 4th Toe and Right 5th Toe)  Patient tolerance:  Patient tolerated the procedure well with no immediate complications

## 2025-03-03 ENCOUNTER — CLINICAL SUPPORT (OUTPATIENT)
Dept: REHABILITATION | Facility: HOSPITAL | Age: 30
End: 2025-03-03
Payer: MEDICARE

## 2025-03-03 DIAGNOSIS — R29.898 WEAKNESS OF BOTH LOWER EXTREMITIES: ICD-10-CM

## 2025-03-03 PROCEDURE — 97161 PT EVAL LOW COMPLEX 20 MIN: CPT

## 2025-03-03 NOTE — PROGRESS NOTES
"  Outpatient Rehab    Physical Therapy Evaluation    Patient Name: Low Gutierrez  MRN: [id number]  YOB: 1995  Encounter Date: 3/3/2025    Therapy Diagnosis:   Encounter Diagnosis   Name Primary?    Weakness of both lower extremities      Physician: Samantha Gonzales DO    Physician Orders: Eval and Treat  Medical Diagnosis: BLE weakness    Visit # / Visits Authorized:  1 / $2330 cap   Date of Evaluation:  3/3/2025   Insurance Authorization Period: 3/3/2025 to 6/6/6/2025  Plan of Care Certification:  3/3/2025 to 6/6/2025      Time In: 0810   Time Out: 0900  Total Time: 50   Total Billable Time: 45    Intake Outcome Measure for FOTO Survey    Therapist reviewed FOTO scores for Low Gutierrez on 3/3/2025.   FOTO report - see Media section or FOTO account episode details.     Intake Score: 42%    Precautions     Dmii, polyneuropathy      Subjective   History of Present Illness  Low is a 29 y.o. male who reports to physical therapy with a chief concern of c/o weakness in the legs "for a while" .with numbness and tingling from waist to B feet..     The patient reports a medical diagnosis of history of uncontrolled DM, with pain down his.    Diagnostic tests related to this condition: None.        Dominant Hand: Left    Activities of Daily Living  Social history was obtained from Patient.          Patient Responsibilities: Community mobility, Home management    Previously independent with activities of daily living? Yes     Currently independent with activities of daily living? Yes          Previously independent with instrumental activities of daily living? Yes     Currently independent with instrumental activities of daily living? Yes              Pain     Patient reports a current pain level of 8/10. Pain at best is reported as 5/10. Pain at worst is reported as 10/10.   Location: from waist down to feet  Clinical Progression (since onset): Stable  Pain Qualities: Needle-like, " Burning  Pain-Relieving Factors: Medications - prescription  Pain-Aggravating Factors: Other (Comment)  Other Pain-Aggravating Factors: hurts all the time         Living Arrangements  Living Situation  Housing: Home independently  Living Arrangements: Spouse/significant other  Support Systems: Spouse/significant other    Home Setup  Type of Structure: House  Home Access: Level entry  Number of Levels in Home: One level  Existing Accessibility Options: Walk-up entrance  Patient is able to live on main floor of home: Yes  Primary Bedroom: 1st floor  Primary Bathroom: 1st floor  Location of Additional Bathrooms: 1st floor  Bathroom Equipment: None  Kitchen: 1st floor  Laundry: 1st floor          Past Medical History/Physical Systems Review:   Low Gutierrez  has a past medical history of Diabetes mellitus, type 2.    Low Gutierrez  has a past surgical history that includes right arm surgery.    Low has a current medication list which includes the following prescription(s): alcohol swabs, blood sugar diagnostic, blood-glucose meter, insulin aspart u-100, insulin glargine-yfgn, insulin syringe-needle u-100, lancets, multivitamin, pen needle, diabetic, pregabalin, propranolol, and sildenafil.    Review of patient's allergies indicates:   Allergen Reactions    Wasp venom Swelling        Objective   Knee Observations  Right Knee Observations  Not Present: Straight Leg Raise Extensor Lag  Left Knee Observations  Not Present: Straight Leg Raise Extensor Lag                  Hip Strength - Planes of Motion   Right Strength Right Pain Left Strength Left  Pain   Flexion (L2) 4+   4+     Extension 5   5     ABduction 5   5     ADduction 5   5     Internal Rotation           External Rotation               Knee Strength   Right Strength Right Pain Left Strength Left  Pain   Flexion (S2) 4+   4+     Prone Flexion           Extension (L3) 5   5       Knee Extensor Lag  No Lag: Right and Left       Ankle/Foot Strength -  Planes of Motion   Right Strength Right Pain Left Strength Left  Pain   Dorsiflexion (L4) 5   5     Plantar Flexion (S1) 5   5     Inversion 5   5     Eversion 5   5     Great Toe Flexion 5   5     Great Toe Extension (L5) 5   5     Lesser Toes Flexion 5   5     Lesser Toes Extension 5   5             Assessment & Plan   Assessment  Low    Presentation of Symptoms: Uncomplicated  Will Comorbidities Impact Care: Yes  DMII    Functional Limitations: Activity tolerance, Functional mobility, Gait limitations, Gross motor coordination, Maintaining balance  Impairments: Impaired balance  Personal Factors Affecting Prognosis: Pain, Physical limitations    Prognosis: Fair  Assessment Details: Patient is a 29 yom with a history of DMII, has c/o pain from his waist to his feet.  He demonstrates good strength and gait /s deviation.    Plan  From a physical therapy perspective, the patient would benefit from: Skilled Rehab Services    Planned therapy interventions include: Therapeutic exercise, Therapeutic activities, Aquatic therapy, Neuromuscular re-education, Manual therapy, ADLs/IADLs, and Gait training.            Visit Frequency: 2 times Per Week for 12 Weeks.       This plan was discussed with Patient.   Discussion participants: Agreed Upon Plan of Care             Patient's spiritual, cultural, and educational needs considered and patient agreeable to plan of care and goals.     Education  Education was done with Patient. The patient's learning style includes Demonstration and Listening. The patient Verbalizes understanding.                 Goals:   Active       Ambulation/movement       Patient will accommodate walking on uneven surfaces community distances with <5/10 pain in BLE       Start:  03/03/25    Expected End:  06/06/25               Functional outcome       Patient will show a significant change in FOTO score to >52 patient-reported outcome tool to demonstrate subjective improvement       Start:  03/03/25     Expected End:  06/06/25            Patient will demonstrate independence in home program for support of progression       Start:  03/03/25    Expected End:  06/06/25               Leisure activities       Patient will participate in fishing 2 hours with less than 5/10 pain in BLE.       Start:  03/03/25    Expected End:  06/06/25               Pain       Patient will report pain  worst of 5/10 demonstrating a reduction of overall pain       Start:  03/03/25    Expected End:  06/06/25                LI MANZANO, PT

## 2025-03-06 ENCOUNTER — CLINICAL SUPPORT (OUTPATIENT)
Dept: REHABILITATION | Facility: HOSPITAL | Age: 30
End: 2025-03-06
Payer: MEDICARE

## 2025-03-06 DIAGNOSIS — R29.898 WEAKNESS OF BOTH LOWER EXTREMITIES: Primary | ICD-10-CM

## 2025-03-06 PROCEDURE — 97113 AQUATIC THERAPY/EXERCISES: CPT

## 2025-03-07 NOTE — PROGRESS NOTES
Outpatient Rehab    Physical Therapy Progress Note    Patient Name: Low Gutierrez  MRN: [id number]  YOB: 1995  Encounter Date: 3/6/2025    Therapy Diagnosis:   Encounter Diagnosis   Name Primary?    Weakness of both lower extremities Yes     Physician: Samantha Gonzales DO    Physician Orders: Eval and Treat  Medical Diagnosis: BLE weakness  Visit # / Visits Authorized:  1 / $2330 cap   Date of Evaluation:  3/3/2025   Insurance Authorization Period: 3/3/2025 to 6/6/6/2025  Plan of Care Certification:  3/3/2025 to 6/6/2025        Time In: 1700   Time Out: 1800  Total Time: 60   Total Billable Time: 55    FOTO:  Intake Score: 42%  Survey Score 1:  %  Survey Score 2:  %    Precautions     Dmii, polyneuropathy      Subjective      Pain reported as 6/10. BLE    Objective           Treatment:  Therapeutic Exercise  Therapeutic Exercise Activity 1: bicycle motion 5 min  Therapeutic Exercise Activity 2: seated hip abd/add x 30  Therapeutic Exercise Activity 3: seated LAQ x 30              Therapeutic Activity  Therapeutic Activity 1: standing hip abd/add x 30  Therapeutic Activity 2: standing hip flexion/ext x 30  Therapeutic Activity 3: squats x 30  Therapeutic Activity 4: heel raise/toe raise x 30  Therapeutic Activity 5: squat shld horiz abd/add left x 30 /c paddle  Therapeutic Activity 6: squat shld press x 30 with buoy         Assessment & Plan   Assessment: Patient received aquatic therapy and tolerated treatment /s c/o.  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       Patient will continue to benefit from skilled outpatient physical therapy to address the deficits listed in the problem list box on initial evaluation, provide pt/family education and to maximize pt's level of independence in the home and community environment.     Patient's spiritual, cultural, and educational needs considered and patient agreeable to plan of care and goals.           Plan:      Goals:    Active       Ambulation/movement       Patient will accommodate walking on uneven surfaces community distances with <5/10 pain in BLE       Start:  03/03/25    Expected End:  06/06/25               Functional outcome       Patient will show a significant change in FOTO score to >52 patient-reported outcome tool to demonstrate subjective improvement       Start:  03/03/25    Expected End:  06/06/25            Patient will demonstrate independence in home program for support of progression       Start:  03/03/25    Expected End:  06/06/25               Leisure activities       Patient will participate in fishing 2 hours with less than 5/10 pain in BLE.       Start:  03/03/25    Expected End:  06/06/25               Pain       Patient will report pain  worst of 5/10 demonstrating a reduction of overall pain       Start:  03/03/25    Expected End:  06/06/25                LI MANZANO, PT

## 2025-03-12 ENCOUNTER — CLINICAL SUPPORT (OUTPATIENT)
Dept: REHABILITATION | Facility: HOSPITAL | Age: 30
End: 2025-03-12
Payer: MEDICARE

## 2025-03-12 DIAGNOSIS — R29.898 WEAKNESS OF BOTH LOWER EXTREMITIES: Primary | ICD-10-CM

## 2025-03-12 PROBLEM — E03.8 TSH DEFICIENCY: Status: RESOLVED | Noted: 2023-03-27 | Resolved: 2025-03-12

## 2025-03-12 PROCEDURE — 97113 AQUATIC THERAPY/EXERCISES: CPT

## 2025-03-12 NOTE — PROGRESS NOTES
Clinical Information for Insurance Authorization     Outpatient Rehab    Physical Therapy Progress Note    Patient Name: Low Gutierrez  MRN: [id number]  YOB: 1995  Encounter Date: 3/12/2025    Therapy Diagnosis:   Encounter Diagnosis   Name Primary?    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  Pain reported as 0/10. BLE    Objective           Treatment:  Therapeutic Exercise  Therapeutic Exercise Activity 1: bicycle motion 3 min  Therapeutic Exercise Activity 2: seated hip abd/add x 30  Therapeutic Exercise Activity 3: seated LAQ x 30         Therapeutic Activity  Therapeutic Activity 1: standing hip abd/add x 30  Therapeutic Activity 2: standing hip flexion/ext x 30  Therapeutic Activity 3: squats x 30  Therapeutic Activity 4: heel raise/toe raise x 30  Therapeutic Activity 5: squat shld horiz abd/add left x 30 /c paddle  Therapeutic Activity 6: squat shld press x 30 with buoy  Therapeutic Activity 7: treadmill 1.0 mph x 5 min  Therapeutic Activity 8: up/down 5 steps x 2              Assessment & Plan   Assessment: Patient received aquatic therapy and tolerated treatment, had c/o right shoulder pain.  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       Patient will continue to  benefit from skilled outpatient physical therapy to address the deficits listed in the problem list box on initial evaluation, provide pt/family education and to maximize pt's level of independence in the home and community environment.     Patient's spiritual, cultural, and educational needs considered and patient agreeable to plan of care and goals.           Plan: continue with strengthening and stretching in pool or on land as tolerated by patient.  Dr Samantha Gonzales has been contacted re:  order to treat right UE.    Goals:   Active       Ambulation/movement       Patient will accommodate walking on uneven surfaces community distances with <5/10 pain in BLE       Start:  03/03/25    Expected End:  06/06/25               Functional outcome       Patient will show a significant change in FOTO score to >52 patient-reported outcome tool to demonstrate subjective improvement       Start:  03/03/25    Expected End:  06/06/25            Patient will demonstrate independence in home program for support of progression       Start:  03/03/25    Expected End:  06/06/25               Leisure activities       Patient will participate in fishing 2 hours with less than 5/10 pain in BLE.       Start:  03/03/25    Expected End:  06/06/25               Pain       Patient will report pain  worst of 5/10 demonstrating a reduction of overall pain       Start:  03/03/25    Expected End:  06/06/25                LI MANZANO PT  Dates of Services: 3/1/2025-6/6/2025  Discharge Plan: Patient will be discharged from skilled physical therapy treatment once all goals have been met, patient has plateaued, or physician/insurance requests discontinuation of care. Patient will be discharged with a home exercise program.   Type of therapy: Rehabilitative  ICD-10 Diagnosis Code(s): R29.898 (ICD-10-CM) - Weakness of both lower extremities   Which side is symptomatic? both  Surgical: No  Surgical procedure: N/A  Surgery date:  N/A.  Presenting symptoms/diagnoses are unspecified in nature.  Presenting symptoms are radiating in nature.   The rehabilitation is not related to a diagnosis of cancer.  The rehabilitation is not related to a diagnosis of lymphedema.  Patient's clinical presentation is:  Moderate objective and functional deficits: consistent symptoms and/or symptoms that are intensified with activity with moderate loss of range of motion, strength, or ability to perform daily tasks  CPT Codes Requested:  97634 [therapeutic exercise], 53507 [neuromuscular re-education], 24934 [gait training], 45375 [manual therapy], 80153 [therapeutic activities], 11867 [self care/home management training], 18526 [mechanical traction], and 50999 [physical performance tests and management]

## 2025-03-17 ENCOUNTER — CLINICAL SUPPORT (OUTPATIENT)
Dept: REHABILITATION | Facility: HOSPITAL | Age: 30
End: 2025-03-17
Payer: MEDICARE

## 2025-03-17 DIAGNOSIS — R29.898 WEAKNESS OF BOTH LOWER EXTREMITIES: Primary | ICD-10-CM

## 2025-03-17 PROCEDURE — 97140 MANUAL THERAPY 1/> REGIONS: CPT

## 2025-03-17 NOTE — PROGRESS NOTES
Outpatient Rehab    Physical Therapy Progress Note    Patient Name: Low Gutierrez  MRN: [id number]  YOB: 1995  Encounter Date: 3/17/2025    Therapy Diagnosis:   Encounter Diagnosis   Name Primary?    Weakness of both lower extremities Yes     Physician: Samantha Gonzales DO    Physician Orders: Eval and Treat  Medical Diagnosis: Weakness of both lower extremities    Visit # / Visits Authorized:  4/10  Date of Evaluation:  3/3/2025   Insurance Authorization Period: 3/3/2025 to 6/6/6/2025  Plan of Care Certification:  3/3/2025 to 6/6/2025                 PT/PTA:     Number of PTA visits since last PT visit:   Time In: 0700   Time Out: 0800  Total Time: 60   Total Billable Time: 55    FOTO:  Intake Score: 42%  Survey Score 1:  %  Survey Score 2:  %    Precautions     DMII, polyneuropathy, brachial plexus injury at birth      Subjective   right should and arm pain.  Pain reported as 6/10.      Objective           Treatment:  Manual Therapy  MT 1: graston to right lats, right scapula  MT 2: graston to upper traps  MT 3: PROM right shoulder flexion abd  MT 4: CRAM right shoulder, right elbow      Time Entry(in minutes):  Manual Therapy Time Entry: 55    Assessment & Plan   Assessment: Patient arrived, had c/o right shoulder and elbow pain,  myofascial pain.  Received myofascial treatment and stretch of right UE.  Patient reports shoulder felt stretched out and felt a little better.       Patient will continue to benefit from skilled outpatient physical therapy to address the deficits listed in the problem list box on initial evaluation, provide pt/family education and to maximize pt's level of independence in the home and community environment.     Patient's spiritual, cultural, and educational needs considered and patient agreeable to plan of care and goals.           Plan: continue with strengthening, stretching,to improve strength, rom and decrease pain    Goals:   Active       Ambulation/movement        Patient will accommodate walking on uneven surfaces community distances with <5/10 pain in BLE       Start:  03/03/25    Expected End:  06/06/25               Functional outcome       Patient will show a significant change in FOTO score to >52 patient-reported outcome tool to demonstrate subjective improvement       Start:  03/03/25    Expected End:  06/06/25            Patient will demonstrate independence in home program for support of progression       Start:  03/03/25    Expected End:  06/06/25               Leisure activities       Patient will participate in fishing 2 hours with less than 5/10 pain in BLE.       Start:  03/03/25    Expected End:  06/06/25               Pain       Patient will report pain  worst of 5/10 demonstrating a reduction of overall pain       Start:  03/03/25    Expected End:  06/06/25                LI MANZANO, PT

## 2025-03-19 ENCOUNTER — CLINICAL SUPPORT (OUTPATIENT)
Dept: REHABILITATION | Facility: HOSPITAL | Age: 30
End: 2025-03-19
Payer: MEDICARE

## 2025-03-19 DIAGNOSIS — R29.898 WEAKNESS OF BOTH LOWER EXTREMITIES: Primary | ICD-10-CM

## 2025-03-19 NOTE — PROGRESS NOTES
Outpatient Rehab    Physical Therapy Progress Note    Patient Name: Low Gutierrez  MRN: [id number]  YOB: 1995  Encounter Date: 3/19/2025    Therapy Diagnosis:   Encounter Diagnosis   Name Primary?    Weakness of both lower extremities Yes     Physician: Samantha Gonzales DO    Physician Orders: Eval and Treat  Medical Diagnosis: Weakness of both lower extremities  Visit # / Visits Authorized:  1 / $2330 cap   Date of Evaluation:  3/3/2025     Plan of Care Certification:  3/3/2025 to 6/6/2025   Visit # / Visits Authorized:  0 / 10  Insurance Authorization Period: 3/3/2025 to 6/6/2025     PT/PTA: PT   Number of PTA visits since last PT visit:0  Time In: 0725   Time Out: 0755  Total Time: 30   Total Billable Time: 25    FOTO:  Intake Score: 42%  Survey Score 1:  %  Survey Score 2:  %         Subjective   patient was 25 min late today..  Pain reported as 6/10. right shoulder girdle    Objective           Treatment:  Manual Therapy  MT 1: graston to right lats, right scapula, right UT x 15 min  Modalities  Electrical Stimulation (Parameters): hi volt stim 50pps at intensity 230 x10 min to right scapulothoracic area    Time Entry(in minutes):       Assessment & Plan   Assessment: patient received hivolt stim to right shoulder girdle, upper traps very reactive.  Patient stated muscles were more calm /p muscle stim.       Patient will continue to benefit from skilled outpatient physical therapy to address the deficits listed in the problem list box on initial evaluation, provide pt/family education and to maximize pt's level of independence in the home and community environment.     Patient's spiritual, cultural, and educational needs considered and patient agreeable to plan of care and goals.           Plan: continue with strengthening, stretching,to improve strength, rom and decrease pain    Goals:   Active       Ambulation/movement       Patient will accommodate walking on uneven surfaces community  distances with <5/10 pain in BLE       Start:  03/03/25    Expected End:  06/06/25               Functional outcome       Patient will show a significant change in FOTO score to >52 patient-reported outcome tool to demonstrate subjective improvement       Start:  03/03/25    Expected End:  06/06/25            Patient will demonstrate independence in home program for support of progression       Start:  03/03/25    Expected End:  06/06/25               Leisure activities       Patient will participate in fishing 2 hours with less than 5/10 pain in BLE.       Start:  03/03/25    Expected End:  06/06/25               Pain       Patient will report pain  worst of 5/10 demonstrating a reduction of overall pain       Start:  03/03/25    Expected End:  06/06/25                LI MANZANO, PT

## 2025-04-03 ENCOUNTER — TELEPHONE (OUTPATIENT)
Dept: REHABILITATION | Facility: HOSPITAL | Age: 30
End: 2025-04-03
Payer: MEDICARE

## 2025-04-03 NOTE — TELEPHONE ENCOUNTER
Called patient to discuss physical therapy appointments as patient has not attended since 3/19/2025 (4 no shows). No answer. Unable to leave message.

## 2025-05-18 ENCOUNTER — HOSPITAL ENCOUNTER (EMERGENCY)
Facility: HOSPITAL | Age: 30
Discharge: HOME | End: 2025-05-18
Payer: MEDICARE

## 2025-05-18 VITALS
BODY MASS INDEX: 17.66 KG/M2 | HEIGHT: 76 IN | WEIGHT: 145 LBS | SYSTOLIC BLOOD PRESSURE: 111 MMHG | HEART RATE: 98 BPM | TEMPERATURE: 98 F | OXYGEN SATURATION: 98 % | RESPIRATION RATE: 12 BRPM | DIASTOLIC BLOOD PRESSURE: 76 MMHG

## 2025-05-18 DIAGNOSIS — Z76.0 ENCOUNTER FOR MEDICATION REFILL: ICD-10-CM

## 2025-05-18 DIAGNOSIS — K59.00 CONSTIPATION, UNSPECIFIED CONSTIPATION TYPE: Primary | ICD-10-CM

## 2025-05-18 DIAGNOSIS — E13.9 DIABETES MELLITUS TYPE 1.5: Primary | ICD-10-CM

## 2025-05-18 DIAGNOSIS — E11.65 UNCONTROLLED TYPE 2 DIABETES MELLITUS WITH HYPERGLYCEMIA: ICD-10-CM

## 2025-05-18 LAB
ALBUMIN SERPL BCP-MCNC: 4 G/DL (ref 3.5–5)
ALBUMIN/GLOB SERPL: 1.2 {RATIO}
ALP SERPL-CCNC: 92 U/L (ref 40–150)
ALT SERPL W P-5'-P-CCNC: 13 U/L
ANION GAP SERPL CALCULATED.3IONS-SCNC: 16 MMOL/L (ref 7–16)
AST SERPL W P-5'-P-CCNC: 16 U/L (ref 11–45)
BASOPHILS # BLD AUTO: 0.02 K/UL (ref 0–0.2)
BASOPHILS NFR BLD AUTO: 0.4 % (ref 0–1)
BILIRUB SERPL-MCNC: 0.7 MG/DL
BILIRUB UR QL STRIP: NEGATIVE
BUN SERPL-MCNC: 14 MG/DL (ref 9–21)
BUN/CREAT SERPL: 15 (ref 6–20)
CALCIUM SERPL-MCNC: 8.9 MG/DL (ref 8.4–10.2)
CHLORIDE SERPL-SCNC: 99 MMOL/L (ref 98–107)
CLARITY UR: CLEAR
CO2 SERPL-SCNC: 24 MMOL/L (ref 22–29)
COLOR UR: YELLOW
CREAT SERPL-MCNC: 0.92 MG/DL (ref 0.72–1.25)
DIFFERENTIAL METHOD BLD: ABNORMAL
EGFR (NO RACE VARIABLE) (RUSH/TITUS): 115 ML/MIN/1.73M2
EOSINOPHIL # BLD AUTO: 0.01 K/UL (ref 0–0.5)
EOSINOPHIL NFR BLD AUTO: 0.2 % (ref 1–4)
ERYTHROCYTE [DISTWIDTH] IN BLOOD BY AUTOMATED COUNT: 11.6 % (ref 11.5–14.5)
GLOBULIN SER-MCNC: 3.4 G/DL (ref 2–4)
GLUCOSE SERPL-MCNC: 396 MG/DL (ref 74–100)
GLUCOSE UR STRIP-MCNC: >1000 MG/DL
HCO3 UR-SCNC: 29.5 MMOL/L (ref 24–28)
HCT VFR BLD AUTO: 42.7 % (ref 40–54)
HCT VFR BLD CALC: 41 % (ref 35–51)
HGB BLD-MCNC: 14.4 G/DL (ref 13.5–18)
IMM GRANULOCYTES # BLD AUTO: 0.01 K/UL (ref 0–0.04)
IMM GRANULOCYTES NFR BLD: 0.2 % (ref 0–0.4)
KETONES UR STRIP-SCNC: 40 MG/DL
LDH SERPL L TO P-CCNC: 1.3 MMOL/L (ref 0.3–1.2)
LEUKOCYTE ESTERASE UR QL STRIP: NEGATIVE
LYMPHOCYTES # BLD AUTO: 1.32 K/UL (ref 1–4.8)
LYMPHOCYTES NFR BLD AUTO: 23.7 % (ref 27–41)
MCH RBC QN AUTO: 29.5 PG (ref 27–31)
MCHC RBC AUTO-ENTMCNC: 33.7 G/DL (ref 32–36)
MCV RBC AUTO: 87.5 FL (ref 80–96)
MONOCYTES # BLD AUTO: 0.44 K/UL (ref 0–0.8)
MONOCYTES NFR BLD AUTO: 7.9 % (ref 2–6)
MPC BLD CALC-MCNC: 11.7 FL (ref 9.4–12.4)
MUCOUS, UA: ABNORMAL /LPF
NEUTROPHILS # BLD AUTO: 3.77 K/UL (ref 1.8–7.7)
NEUTROPHILS NFR BLD AUTO: 67.6 % (ref 53–65)
NITRITE UR QL STRIP: NEGATIVE
NRBC # BLD AUTO: 0 X10E3/UL
NRBC, AUTO (.00): 0 %
PCO2 BLDA: 51 MMHG (ref 41–51)
PH SMN: 7.37 [PH] (ref 7.32–7.42)
PH UR STRIP: 6 PH UNITS
PLATELET # BLD AUTO: 220 K/UL (ref 150–400)
PO2 BLDA: 17 MMHG (ref 25–40)
POC BASE EXCESS: 3.2 MMOL/L (ref -2–3)
POC CO2: 31.1 MMOL/L
POC IONIZED CALCIUM: 1.03 MMOL/L (ref 1.15–1.35)
POC SATURATED O2: 23 % (ref 40–70)
POCT GLUCOSE: 347 MG/DL (ref 60–95)
POTASSIUM BLD-SCNC: 3.6 MMOL/L (ref 3.4–4.5)
POTASSIUM SERPL-SCNC: 3.8 MMOL/L (ref 3.5–5.1)
PROT SERPL-MCNC: 7.4 G/DL (ref 6.4–8.3)
PROT UR QL STRIP: 30
RBC # BLD AUTO: 4.88 M/UL (ref 4.6–6.2)
RBC # UR STRIP: NEGATIVE /UL
RBC #/AREA URNS HPF: 2 /HPF
SODIUM BLD-SCNC: 134 MMOL/L (ref 136–145)
SODIUM SERPL-SCNC: 135 MMOL/L (ref 136–145)
SP GR UR STRIP: 1.02
SQUAMOUS #/AREA URNS LPF: ABNORMAL /HPF
UROBILINOGEN UR STRIP-ACNC: NORMAL MG/DL
WBC # BLD AUTO: 5.57 K/UL (ref 4.5–11)
WBC #/AREA URNS HPF: 3 /HPF

## 2025-05-18 PROCEDURE — 83605 ASSAY OF LACTIC ACID: CPT

## 2025-05-18 PROCEDURE — 82947 ASSAY GLUCOSE BLOOD QUANT: CPT

## 2025-05-18 PROCEDURE — 81003 URINALYSIS AUTO W/O SCOPE: CPT | Performed by: NURSE PRACTITIONER

## 2025-05-18 PROCEDURE — 36415 COLL VENOUS BLD VENIPUNCTURE: CPT | Performed by: NURSE PRACTITIONER

## 2025-05-18 PROCEDURE — 84295 ASSAY OF SERUM SODIUM: CPT

## 2025-05-18 PROCEDURE — 25500020 PHARM REV CODE 255: Performed by: NURSE PRACTITIONER

## 2025-05-18 PROCEDURE — 82330 ASSAY OF CALCIUM: CPT

## 2025-05-18 PROCEDURE — 82803 BLOOD GASES ANY COMBINATION: CPT

## 2025-05-18 PROCEDURE — 85025 COMPLETE CBC W/AUTO DIFF WBC: CPT | Performed by: NURSE PRACTITIONER

## 2025-05-18 PROCEDURE — 85014 HEMATOCRIT: CPT

## 2025-05-18 PROCEDURE — 99285 EMERGENCY DEPT VISIT HI MDM: CPT | Mod: 25

## 2025-05-18 PROCEDURE — 80053 COMPREHEN METABOLIC PANEL: CPT | Performed by: NURSE PRACTITIONER

## 2025-05-18 PROCEDURE — 25000003 PHARM REV CODE 250: Performed by: NURSE PRACTITIONER

## 2025-05-18 PROCEDURE — 84132 ASSAY OF SERUM POTASSIUM: CPT

## 2025-05-18 RX ORDER — IOPAMIDOL 755 MG/ML
100 INJECTION, SOLUTION INTRAVASCULAR
Status: COMPLETED | OUTPATIENT
Start: 2025-05-18 | End: 2025-05-18

## 2025-05-18 RX ORDER — INSULIN GLARGINE 100 [IU]/ML
60 INJECTION, SOLUTION SUBCUTANEOUS DAILY
Qty: 18 ML | Refills: 5 | Status: SHIPPED | OUTPATIENT
Start: 2025-05-18 | End: 2025-11-14

## 2025-05-18 RX ORDER — DOCUSATE SODIUM 100 MG/1
100 CAPSULE, LIQUID FILLED ORAL 2 TIMES DAILY
Qty: 60 CAPSULE | Refills: 0 | Status: SHIPPED | OUTPATIENT
Start: 2025-05-18

## 2025-05-18 RX ORDER — POLYETHYLENE GLYCOL 3350 17 G/17G
17 POWDER, FOR SOLUTION ORAL
Status: COMPLETED | OUTPATIENT
Start: 2025-05-18 | End: 2025-05-18

## 2025-05-18 RX ORDER — POLYETHYLENE GLYCOL 3350 17 G/17G
17 POWDER, FOR SOLUTION ORAL DAILY
Qty: 24 EACH | Refills: 0 | Status: SHIPPED | OUTPATIENT
Start: 2025-05-18

## 2025-05-18 RX ORDER — POLYETHYLENE GLYCOL 3350 17 G/17G
17 POWDER, FOR SOLUTION ORAL DAILY
Status: DISCONTINUED | OUTPATIENT
Start: 2025-05-19 | End: 2025-05-18

## 2025-05-18 RX ADMIN — Medication 17 G: at 05:05

## 2025-05-18 RX ADMIN — IOPAMIDOL 100 ML: 755 INJECTION, SOLUTION INTRAVENOUS at 02:05

## 2025-05-18 NOTE — DISCHARGE INSTRUCTIONS
MiraLax and Colace as prescribed and directed.  Drink plenty of fluids to stay hydrated.  Monitor your blood glucose levels and  Follow up with your primary care provider in 2 days. Return to the emergency department for any increase in symptoms or for any other new or worrisome symptoms.

## 2025-05-18 NOTE — ED PROVIDER NOTES
"Encounter Date: 5/18/2025       History     Chief Complaint   Patient presents with    Abdominal Pain    Vomiting    Weakness     Patient presents to the ED via [facility] with c/o lower abdominal pain, vomiting, and weakness x2 days. Patient states he also feels like something is stuck in his throat making it difficult to swallow     30 year old male presents to the emergency department via EMS to be evaluated for abdominal pain, vomiting, and weakness that started yesterday.  He denies any fever.  He notes vomiting one time yesterday, and states "it was red" but he is unsure if it was blood.  He had eaten an Italian sausage pasta prior to vomiting.  He reports a "small ball" of stool with his most recent bowel movement earlier today.  Reports clear urine with no pain or burning on voiding. He states his glucose levels have been "high" with the EMS check being greater than 400.  His sister is with him and states he has not been able to eat normally over the last 2 days.      The history is provided by the patient.   Abdominal Pain  The current episode started two days ago. The onset of the illness was abrupt. The problem has not changed since onset.The abdominal pain is generalized. The abdominal pain does not radiate. The abdominal pain is relieved by nothing. The other symptoms of the illness include fatigue, nausea and vomiting. The other symptoms of the illness do not include fever, jaundice, melena, shortness of breath, diarrhea or dysuria.   The fatigue began yesterday. The fatigue has been unchanged since its onset.   Nausea began yesterday. The nausea is associated with eating.   The vomiting began yesterday. The emesis contains stomach contents ("looked red", but unsure of the presence of blood).   The patient has had a change in bowel habit. Additional symptoms associated with the illness include anorexia and constipation. Symptoms associated with the illness do not include chills, diaphoresis, " heartburn, urgency, hematuria, frequency or back pain. Significant associated medical issues include diabetes.     Review of patient's allergies indicates:   Allergen Reactions    Wasp venom Swelling     Past Medical History:   Diagnosis Date    Diabetes mellitus, type 2      Past Surgical History:   Procedure Laterality Date    right arm surgery      at birth     No family history on file.  Social History[1]  Review of Systems   Constitutional:  Positive for activity change, appetite change and fatigue. Negative for chills, diaphoresis and fever.   HENT: Negative.     Respiratory:  Negative for chest tightness, shortness of breath, wheezing and stridor.    Cardiovascular:  Negative for chest pain and palpitations.   Gastrointestinal:  Positive for abdominal pain, anorexia, constipation, nausea and vomiting. Negative for blood in stool, diarrhea, heartburn, jaundice and melena.   Genitourinary:  Negative for dysuria, frequency, hematuria and urgency.   Musculoskeletal: Negative.  Negative for back pain.   Skin: Negative.    Neurological:  Positive for weakness (generalized).   Psychiatric/Behavioral: Negative.         Physical Exam     Initial Vitals [05/18/25 1240]   BP Pulse Resp Temp SpO2   119/79 96 13 98.4 °F (36.9 °C) 98 %      MAP       --         Physical Exam    Vitals reviewed.  Constitutional: He appears well-developed and well-nourished. He is not diaphoretic. No distress.   HENT:   Head: Normocephalic and atraumatic.   Right Ear: External ear normal.   Left Ear: External ear normal.   Nose: Nose normal. Mouth/Throat: Oropharynx is clear and moist. No oropharyngeal exudate.   Eyes: Conjunctivae and EOM are normal. Pupils are equal, round, and reactive to light.   Neck: Neck supple. No tracheal deviation present. No JVD present.   Normal range of motion.  Cardiovascular:  Normal rate and regular rhythm.           Pulmonary/Chest: Breath sounds normal. No stridor. No respiratory distress.   Abdominal:  Abdomen is flat. Bowel sounds are decreased. There is no hepatomegaly. There is generalized abdominal tenderness and tenderness in the right lower quadrant, suprapubic area and left lower quadrant.   No right CVA tenderness.  No left CVA tenderness. There is no rebound, no guarding, no tenderness at McBurney's point and negative Zambrano's sign. negative Rovsing's sign  Musculoskeletal:         General: No tenderness or edema.      Cervical back: Normal range of motion and neck supple.     Neurological: He is alert and oriented to person, place, and time. GCS score is 15. GCS eye subscore is 4. GCS verbal subscore is 5. GCS motor subscore is 6.   Skin: Skin is warm and dry. Capillary refill takes less than 2 seconds.   Psychiatric: He has a normal mood and affect. Thought content normal.         Medical Screening Exam   See Full Note    ED Course   Procedures  Labs Reviewed   COMPREHENSIVE METABOLIC PANEL - Abnormal       Result Value    Sodium 135 (*)     Potassium 3.8      Chloride 99      CO2 24      Anion Gap 16      Glucose 396 (*)     BUN 14      Creatinine 0.92      BUN/Creatinine Ratio 15      Calcium 8.9      Total Protein 7.4      Albumin 4.0      Globulin 3.4      A/G Ratio 1.2      Bilirubin, Total 0.7      Alk Phos 92      ALT 13      AST 16      eGFR 115     URINALYSIS, REFLEX TO URINE CULTURE - Abnormal    Color, UA Yellow      Clarity, UA Clear      pH, UA 6.0      Leukocytes, UA Negative      Nitrites, UA Negative      Protein, UA 30 (*)     Glucose, UA >1000 (*)     Ketones, UA 40 (*)     Urobilinogen, UA Normal      Bilirubin, UA Negative      Blood, UA Negative      Specific Gravity, UA 1.020     CBC WITH DIFFERENTIAL - Abnormal    WBC 5.57      RBC 4.88      Hemoglobin 14.4      Hematocrit 42.7      MCV 87.5      MCH 29.5      MCHC 33.7      RDW 11.6      Platelet Count 220      MPV 11.7      Neutrophils % 67.6 (*)     Lymphocytes % 23.7 (*)     Monocytes % 7.9 (*)     Eosinophils % 0.2 (*)      Basophils % 0.4      Immature Granulocytes % 0.2      nRBC, Auto 0.0      Neutrophils, Abs 3.77      Lymphocytes, Absolute 1.32      Monocytes, Absolute 0.44      Eosinophils, Absolute 0.01      Basophils, Absolute 0.02      Immature Granulocytes, Absolute 0.01      nRBC, Absolute 0.00      Diff Type Auto     URINALYSIS, MICROSCOPIC - Abnormal    WBC, UA 3      RBC, UA 2      Squamous Epithelial Cells, UA Occasional (*)     Mucous Occasional (*)    CBC W/ AUTO DIFFERENTIAL    Narrative:     The following orders were created for panel order CBC auto differential.  Procedure                               Abnormality         Status                     ---------                               -----------         ------                     CBC with Differential[[id number]]       Abnormal            Final result                 Please view results for these tests on the individual orders.          Imaging Results              CT Abdomen Pelvis With IV Contrast NO Oral Contrast (Final result)  Result time 05/18/25 16:12:08      Final result by Otis Padilla MD (05/18/25 16:12:08)                   Impression:      No definite acute intra-abdominal process seen.      Electronically signed by: Otis Padilla  Date:    05/18/2025  Time:    16:12               Narrative:    EXAMINATION:  CT ABDOMEN PELVIS WITH IV CONTRAST    CLINICAL HISTORY:  Abdominal pain, acute, nonlocalized;    TECHNIQUE:  Low dose axial images, sagittal and coronal reformations were obtained from the lung bases to the pubic symphysis after 100 mL of Isovue 370 IV.    COMPARISON:  None    FINDINGS:  Heart: Normal in size. No pericardial effusion.    Lung Bases: Well aerated, without consolidation or pleural fluid.    Liver: Normal in size and attenuation, with no focal hepatic lesions.    Gallbladder: No calcified gallstones.    Bile Ducts: No evidence of dilated ducts.    Pancreas: No mass or peripancreatic fat stranding.    Spleen:  Unremarkable.    Adrenals: Unremarkable.    Kidneys/ Ureters: Unremarkable.    Bladder: No evidence of wall thickening.    Reproductive organs: Unremarkable.    GI Tract/Mesentery: No evidence of bowel obstruction.  No significant fluid levels.  No definite bowel wall thickening.  No free air free fluid.  No stranding of the fat in the right lower quadrant to suggest appendicitis.    Peritoneal Space: No ascites. No free air.    Retroperitoneum: No significant adenopathy.    Abdominal wall: Unremarkable.    Vasculature: No significant atherosclerosis or aneurysm.    Bones: No acute fracture.                                       XR ABDOMEN, ACUTE 2 OR MORE VIEWS WITH CHEST (Final result)  Result time 05/18/25 15:27:23      Final result by Otis Padilla MD (05/18/25 15:27:23)                   Impression:      There are a few mildly distended loops of small bowel within the right lower abdomen possible mild enteritis.  No evidence of bowel obstruction.      Electronically signed by: Otis Padilla  Date:    05/18/2025  Time:    15:27               Narrative:    EXAMINATION:  XR ABDOMEN ACUTE 2 OR MORE VIEWS WITH CHEST    CLINICAL HISTORY:  abdominal pain;    TECHNIQUE:  Flat and upright abdomen with chest performed    COMPARISON:  June 26, 2023    FINDINGS:  There are few slightly distended loops of small bowel within the right lower abdomen.  Findings could represent a mild localized ileus or enteritis.  No abnormal calcification.  No acute bone abnormality.  Lungs are clear. Heart is not enlarged.                                       Medications   iopamidoL (ISOVUE-370) injection 100 mL (100 mLs Intravenous Given 5/18/25 1431)   polyethylene glycol packet 17 g (17 g Oral Given 5/18/25 1709)     Medical Decision Making  30 year old male presents to the emergency department via EMS to be evaluated for abdominal pain, vomiting, and weakness that started yesterday.  He denies any fever.  He notes vomiting one time  "yesterday, and states "it was red" but he is unsure if it was blood.  He had eaten an Italian sausage pasta prior to vomiting.  He reports a "small ball" of stool with his most recent bowel movement earlier today.  Reports clear urine with no pain or burning on voiding. He states his glucose levels have been "high" with the EMS check being greater than 400.  His sister is with him and states he has not been able to eat normally over the last 2 days.      Labs ordered and reviewed.  Abdominal xray ordered and reviewed with radiologist interpretation significant for  Findings could represent a mild localized ileus or enteritis.  CT abdomen with contrast ordered. Reviewed with radiologist interpretation significant for No definite acute intra-abdominal process seen.      Diagnosis: constipation; uncontrolled diabetes  Treated with Miralax while in the emergency department.  Prescribed miralax and colace.  Encouraged to increase fluids to promote more regular bowel movements.  Also encouraged to monitor and treat diabetes more regularly, as this could affect his GI system as well.  Verbalized understanding.     Amount and/or Complexity of Data Reviewed  Labs: ordered.  Radiology: ordered. Decision-making details documented in ED Course.    Risk  OTC drugs.  Prescription drug management.               ED Course as of 05/18/25 1919   Sun May 18, 2025   1353 XR ABDOMEN, ACUTE 2 OR MORE VIEWS WITH CHEST [JT]      ED Course User Index  [JT] Osvaldo Sandoval FNP                           Clinical Impression:   Final diagnoses:  [K59.00] Constipation, unspecified constipation type (Primary)  [E11.65] Uncontrolled type 2 diabetes mellitus with hyperglycemia        ED Disposition Condition    Discharge Stable          ED Prescriptions       Medication Sig Dispense Start Date End Date Auth. Provider    polyethylene glycol (GLYCOLAX) 17 gram PwPk Take 17 g by mouth once daily. 24 each 5/18/2025 -- Osvaldo Sandoval FNP    docusate " sodium (COLACE) 100 MG capsule Take 1 capsule (100 mg total) by mouth 2 (two) times daily. 60 capsule 5/18/2025 -- Osvaldo Sandoval FNP          Follow-up Information    None              [1]   Social History  Tobacco Use    Smoking status: Every Day     Types: Cigarettes    Smokeless tobacco: Never   Substance Use Topics    Alcohol use: Yes     Comment: occasionally    Drug use: Yes     Types: Marijuana     Comment: 1 day ago use        Osvaldo Sandoval FNP  05/18/25 [id number]

## 2025-07-10 ENCOUNTER — HOSPITAL ENCOUNTER (EMERGENCY)
Facility: HOSPITAL | Age: 30
Discharge: HOME | End: 2025-07-10
Payer: MEDICARE

## 2025-07-10 VITALS
WEIGHT: 155 LBS | HEART RATE: 120 BPM | HEIGHT: 76 IN | TEMPERATURE: 98 F | OXYGEN SATURATION: 100 % | DIASTOLIC BLOOD PRESSURE: 80 MMHG | SYSTOLIC BLOOD PRESSURE: 128 MMHG | BODY MASS INDEX: 18.88 KG/M2 | RESPIRATION RATE: 16 BRPM

## 2025-07-10 DIAGNOSIS — S90.421A BLISTER (NONTHERMAL), RIGHT GREAT TOE, INITIAL ENCOUNTER: Primary | ICD-10-CM

## 2025-07-10 LAB
ACETONE SERPL QL SCN: NEGATIVE
ALBUMIN SERPL BCP-MCNC: 4.2 G/DL (ref 3.5–5)
ALBUMIN/GLOB SERPL: 1.2 {RATIO}
ALP SERPL-CCNC: 88 U/L (ref 40–150)
ALT SERPL W P-5'-P-CCNC: 11 U/L
ANION GAP SERPL CALCULATED.3IONS-SCNC: 13 MMOL/L (ref 7–16)
AST SERPL W P-5'-P-CCNC: 18 U/L (ref 11–45)
BASOPHILS # BLD AUTO: 0.03 K/UL (ref 0–0.2)
BASOPHILS NFR BLD AUTO: 0.6 % (ref 0–1)
BILIRUB SERPL-MCNC: 0.6 MG/DL
BILIRUB UR QL STRIP: NEGATIVE
BUN SERPL-MCNC: 9 MG/DL (ref 9–21)
BUN/CREAT SERPL: 7 (ref 6–20)
CALCIUM SERPL-MCNC: 8.8 MG/DL (ref 8.4–10.2)
CHLORIDE SERPL-SCNC: 98 MMOL/L (ref 98–107)
CLARITY UR: CLEAR
CO2 SERPL-SCNC: 26 MMOL/L (ref 22–29)
COLOR UR: ABNORMAL
CREAT SERPL-MCNC: 1.3 MG/DL (ref 0.72–1.25)
DIFFERENTIAL METHOD BLD: ABNORMAL
EGFR (NO RACE VARIABLE) (RUSH/TITUS): 76 ML/MIN/1.73M2
EOSINOPHIL # BLD AUTO: 0.01 K/UL (ref 0–0.5)
EOSINOPHIL NFR BLD AUTO: 0.2 % (ref 1–4)
ERYTHROCYTE [DISTWIDTH] IN BLOOD BY AUTOMATED COUNT: 11.9 % (ref 11.5–14.5)
GLOBULIN SER-MCNC: 3.4 G/DL (ref 2–4)
GLUCOSE SERPL-MCNC: 250 MG/DL (ref 70–105)
GLUCOSE SERPL-MCNC: 445 MG/DL (ref 70–105)
GLUCOSE SERPL-MCNC: 448 MG/DL (ref 74–100)
GLUCOSE UR STRIP-MCNC: >1000 MG/DL
HCO3 UR-SCNC: 31.2 MMOL/L (ref 24–28)
HCT VFR BLD AUTO: 41 % (ref 40–54)
HCT VFR BLD CALC: 42 % (ref 35–51)
HGB BLD-MCNC: 13.6 G/DL (ref 13.5–18)
IMM GRANULOCYTES # BLD AUTO: 0.01 K/UL (ref 0–0.04)
IMM GRANULOCYTES NFR BLD: 0.2 % (ref 0–0.4)
KETONES UR STRIP-SCNC: NEGATIVE MG/DL
LACTATE SERPL-SCNC: 3.2 MMOL/L (ref 0.5–2.2)
LDH SERPL L TO P-CCNC: 2.4 MMOL/L (ref 0.3–1.2)
LEUKOCYTE ESTERASE UR QL STRIP: NEGATIVE
LYMPHOCYTES # BLD AUTO: 1.93 K/UL (ref 1–4.8)
LYMPHOCYTES NFR BLD AUTO: 37.7 % (ref 27–41)
MAGNESIUM SERPL-MCNC: 2 MG/DL (ref 1.6–2.6)
MCH RBC QN AUTO: 30.2 PG (ref 27–31)
MCHC RBC AUTO-ENTMCNC: 33.2 G/DL (ref 32–36)
MCV RBC AUTO: 91.1 FL (ref 80–96)
MONOCYTES # BLD AUTO: 0.35 K/UL (ref 0–0.8)
MONOCYTES NFR BLD AUTO: 6.8 % (ref 2–6)
MPC BLD CALC-MCNC: 11.8 FL (ref 9.4–12.4)
NEUTROPHILS # BLD AUTO: 2.79 K/UL (ref 1.8–7.7)
NEUTROPHILS NFR BLD AUTO: 54.5 % (ref 53–65)
NITRITE UR QL STRIP: NEGATIVE
NRBC # BLD AUTO: 0 X10E3/UL
NRBC, AUTO (.00): 0 %
PCO2 BLDA: 54 MMHG (ref 41–51)
PH SMN: 7.37 [PH] (ref 7.32–7.42)
PH UR STRIP: 6.5 PH UNITS
PHOSPHATE SERPL-MCNC: 3.7 MG/DL (ref 2.3–4.7)
PLATELET # BLD AUTO: 220 K/UL (ref 150–400)
PO2 BLDA: 26 MMHG (ref 25–40)
POC BASE EXCESS: 4.6 MMOL/L (ref -2–3)
POC CO2: 32.9 MMOL/L
POC IONIZED CALCIUM: 1.14 MMOL/L (ref 1.15–1.35)
POC SATURATED O2: 46 % (ref 40–70)
POCT GLUCOSE: 402 MG/DL (ref 60–95)
POTASSIUM BLD-SCNC: 3.8 MMOL/L (ref 3.4–4.5)
POTASSIUM SERPL-SCNC: 3.9 MMOL/L (ref 3.5–5.1)
PROT SERPL-MCNC: 7.6 G/DL (ref 6.4–8.3)
PROT UR QL STRIP: NEGATIVE
RBC # BLD AUTO: 4.5 M/UL (ref 4.6–6.2)
RBC # UR STRIP: NEGATIVE /UL
SODIUM BLD-SCNC: 133 MMOL/L (ref 136–145)
SODIUM SERPL-SCNC: 133 MMOL/L (ref 136–145)
SP GR UR STRIP: 1.05
UROBILINOGEN UR STRIP-ACNC: 2 MG/DL
WBC # BLD AUTO: 5.12 K/UL (ref 4.5–11)

## 2025-07-10 PROCEDURE — 85014 HEMATOCRIT: CPT

## 2025-07-10 PROCEDURE — 85025 COMPLETE CBC W/AUTO DIFF WBC: CPT

## 2025-07-10 PROCEDURE — 84100 ASSAY OF PHOSPHORUS: CPT

## 2025-07-10 PROCEDURE — 81003 URINALYSIS AUTO W/O SCOPE: CPT

## 2025-07-10 PROCEDURE — 84295 ASSAY OF SERUM SODIUM: CPT

## 2025-07-10 PROCEDURE — 84132 ASSAY OF SERUM POTASSIUM: CPT

## 2025-07-10 PROCEDURE — 83735 ASSAY OF MAGNESIUM: CPT

## 2025-07-10 PROCEDURE — 82803 BLOOD GASES ANY COMBINATION: CPT

## 2025-07-10 PROCEDURE — 83605 ASSAY OF LACTIC ACID: CPT

## 2025-07-10 PROCEDURE — 82330 ASSAY OF CALCIUM: CPT

## 2025-07-10 PROCEDURE — 25000003 PHARM REV CODE 250

## 2025-07-10 PROCEDURE — 80053 COMPREHEN METABOLIC PANEL: CPT

## 2025-07-10 PROCEDURE — 82009 KETONE BODYS QUAL: CPT

## 2025-07-10 PROCEDURE — 96360 HYDRATION IV INFUSION INIT: CPT

## 2025-07-10 PROCEDURE — 99284 EMERGENCY DEPT VISIT MOD MDM: CPT | Mod: 25

## 2025-07-10 PROCEDURE — 82947 ASSAY GLUCOSE BLOOD QUANT: CPT

## 2025-07-10 PROCEDURE — 96361 HYDRATE IV INFUSION ADD-ON: CPT

## 2025-07-10 PROCEDURE — 82962 GLUCOSE BLOOD TEST: CPT

## 2025-07-10 RX ADMIN — SODIUM CHLORIDE 1000 ML: 9 INJECTION, SOLUTION INTRAVENOUS at 06:07

## 2025-07-10 RX ADMIN — SODIUM CHLORIDE 1000 ML: 9 INJECTION, SOLUTION INTRAVENOUS at 07:07

## 2025-07-10 NOTE — Clinical Note
"[place]" Matt was seen and treated in our emergency department on 7/10/2025.  He may return to work on 07/12/2025.       If you have any questions or concerns, please don't hesitate to call.      Osvaldo Her, NP"

## 2025-07-11 ENCOUNTER — OFFICE VISIT (OUTPATIENT)
Dept: FAMILY MEDICINE | Facility: CLINIC | Age: 30
End: 2025-07-11
Payer: MEDICARE

## 2025-07-11 VITALS
SYSTOLIC BLOOD PRESSURE: 120 MMHG | DIASTOLIC BLOOD PRESSURE: 80 MMHG | BODY MASS INDEX: 19.12 KG/M2 | HEIGHT: 76 IN | RESPIRATION RATE: 20 BRPM | HEART RATE: 99 BPM | OXYGEN SATURATION: 98 % | WEIGHT: 157 LBS | TEMPERATURE: 99 F

## 2025-07-11 DIAGNOSIS — S90.421A INFECTED BLISTER OF GREAT TOE OF RIGHT FOOT, INITIAL ENCOUNTER: Primary | ICD-10-CM

## 2025-07-11 DIAGNOSIS — L08.9 INFECTED BLISTER OF GREAT TOE OF RIGHT FOOT, INITIAL ENCOUNTER: Primary | ICD-10-CM

## 2025-07-11 PROCEDURE — 1160F RVW MEDS BY RX/DR IN RCRD: CPT | Mod: ,,, | Performed by: NURSE PRACTITIONER

## 2025-07-11 PROCEDURE — 3008F BODY MASS INDEX DOCD: CPT | Mod: ,,, | Performed by: NURSE PRACTITIONER

## 2025-07-11 PROCEDURE — 3046F HEMOGLOBIN A1C LEVEL >9.0%: CPT | Mod: ,,, | Performed by: NURSE PRACTITIONER

## 2025-07-11 PROCEDURE — 1159F MED LIST DOCD IN RCRD: CPT | Mod: ,,, | Performed by: NURSE PRACTITIONER

## 2025-07-11 PROCEDURE — 99213 OFFICE O/P EST LOW 20 MIN: CPT | Mod: ,,, | Performed by: NURSE PRACTITIONER

## 2025-07-11 PROCEDURE — 3074F SYST BP LT 130 MM HG: CPT | Mod: ,,, | Performed by: NURSE PRACTITIONER

## 2025-07-11 PROCEDURE — 3079F DIAST BP 80-89 MM HG: CPT | Mod: ,,, | Performed by: NURSE PRACTITIONER

## 2025-07-11 RX ORDER — MUPIROCIN 20 MG/G
OINTMENT TOPICAL 2 TIMES DAILY
Qty: 60 G | Refills: 0 | Status: SHIPPED | OUTPATIENT
Start: 2025-07-11

## 2025-07-11 RX ORDER — SULFAMETHOXAZOLE AND TRIMETHOPRIM 800; 160 MG/1; MG/1
1 TABLET ORAL 2 TIMES DAILY
Qty: 20 TABLET | Refills: 0 | Status: SHIPPED | OUTPATIENT
Start: 2025-07-11 | End: 2025-07-21

## 2025-07-11 NOTE — ED PROVIDER NOTES
Encounter Date: 7/10/2025       History     Chief Complaint   Patient presents with    Toe Injury     Had on a pair of boots with no socks.  Blister on right great toe.      30-year-old male presents to the emergency department for evaluation of right great toe blister.  Reports that he is wearing steel-toed boots while cutting grass without socks and the blister popped and began bleeding.  Reports that he is a diabetic and is concerned for wound healing.  Also states that he did not take diabetic medication today and blood sugar is high.  Denies nausea, vomiting, confusion, fever, chills.    The history is provided by the patient. No  was used.   General Illness   The current episode started just prior to arrival. The problem has been unchanged. The pain is at a severity of 4/10. Nothing relieves the symptoms. Pertinent negatives include no fever, no diarrhea, no nausea, no vomiting, no muscle aches, no neck pain, no cough and no shortness of breath. He has received no recent medical care.     Review of patient's allergies indicates:   Allergen Reactions    Wasp venom Swelling     Past Medical History:   Diagnosis Date    Diabetes mellitus, type 2     Diabetic polyneuropathy 01/18/2023    Hyperthyroidism 07/05/2023    Scoliosis 01/03/2022     Past Surgical History:   Procedure Laterality Date    right arm surgery      at birth     No family history on file.  Social History[1]  Review of Systems   Constitutional:  Negative for activity change, appetite change, chills and fever.   Respiratory:  Negative for cough and shortness of breath.    Gastrointestinal:  Negative for diarrhea, nausea and vomiting.   Endocrine: Negative for polydipsia, polyphagia and polyuria.   Musculoskeletal:  Negative for neck pain and neck stiffness.   Psychiatric/Behavioral:  Negative for confusion.    All other systems reviewed and are negative.      Physical Exam     Initial Vitals [07/10/25 1731]   BP Pulse Resp Temp  SpO2   128/80 (!) 120 16 97.7 °F (36.5 °C) 100 %      MAP       --         Physical Exam    Vitals reviewed.  Constitutional: No distress.   HENT:   Head: Normocephalic. Mouth/Throat: Oropharynx is clear and moist and mucous membranes are normal.   Eyes: Conjunctivae are normal. Right eye exhibits no discharge. Left eye exhibits no discharge.   Neck: Neck supple.   Normal range of motion.  Cardiovascular:  Normal rate, regular rhythm and normal heart sounds.           Pulmonary/Chest: Breath sounds normal. No respiratory distress. He has no wheezes. He has no rhonchi. He has no rales. He exhibits no tenderness.   Abdominal: Abdomen is soft. Bowel sounds are normal. He exhibits no distension and no mass. There is no abdominal tenderness. There is no rebound and no guarding.   Musculoskeletal:         General: No tenderness. Normal range of motion.      Cervical back: Normal range of motion and neck supple.     Lymphadenopathy:     He has no cervical adenopathy.   Neurological: He is alert and oriented to person, place, and time. He has normal strength. No sensory deficit. GCS score is 15. GCS eye subscore is 4. GCS verbal subscore is 5. GCS motor subscore is 6.   Skin: Skin is warm and dry. Capillary refill takes less than 2 seconds.   Small blister noted to sole of right great toe with no active bleeding, drainage.  Pedal pulses intact   Psychiatric: He has a normal mood and affect. His behavior is normal.         Medical Screening Exam   See Full Note    ED Course   Procedures  Labs Reviewed   COMPREHENSIVE METABOLIC PANEL - Abnormal       Result Value    Sodium 133 (*)     Potassium 3.9      Chloride 98      CO2 26      Anion Gap 13      Glucose 448 (*)     BUN 9      Creatinine 1.30 (*)     BUN/Creatinine Ratio 7      Calcium 8.8      Total Protein 7.6      Albumin 4.2      Globulin 3.4      A/G Ratio 1.2      Bilirubin, Total 0.6      Alk Phos 88      ALT 11      AST 18      eGFR 76     URINALYSIS, REFLEX TO  URINE CULTURE - Abnormal    Color, UA Light Yellow      Clarity, UA Clear      pH, UA 6.5      Leukocytes, UA Negative      Nitrites, UA Negative      Protein, UA Negative      Glucose, UA >1000 (*)     Ketones, UA Negative      Urobilinogen, UA 2 (*)     Bilirubin, UA Negative      Blood, UA Negative      Specific Gravity, UA 1.046 (*)    LACTIC ACID, PLASMA - Abnormal    Lactic Acid 3.2 (*)    CBC WITH DIFFERENTIAL - Abnormal    WBC 5.12      RBC 4.50 (*)     Hemoglobin 13.6      Hematocrit 41.0      MCV 91.1      MCH 30.2      MCHC 33.2      RDW 11.9      Platelet Count 220      MPV 11.8      Neutrophils % 54.5      Lymphocytes % 37.7      Monocytes % 6.8 (*)     Eosinophils % 0.2 (*)     Basophils % 0.6      Immature Granulocytes % 0.2      nRBC, Auto 0.0      Neutrophils, Abs 2.79      Lymphocytes, Absolute 1.93      Monocytes, Absolute 0.35      Eosinophils, Absolute 0.01      Basophils, Absolute 0.03      Immature Granulocytes, Absolute 0.01      nRBC, Absolute 0.00      Diff Type Auto     POCT GLUCOSE MONITORING CONTINUOUS - Abnormal    POC Glucose 445 (*)    POCT GLUCOSE MONITORING CONTINUOUS - Abnormal    POC Glucose 250 (*)    MAGNESIUM - Normal    Magnesium 2.0     PHOSPHORUS - Normal    Phosphorus 3.7     CBC W/ AUTO DIFFERENTIAL    Narrative:     The following orders were created for panel order CBC auto differential.  Procedure                               Abnormality         Status                     ---------                               -----------         ------                     CBC with Differential[[id number]]       Abnormal            Final result                 Please view results for these tests on the individual orders.   ACETONE    Acetone Negative     LACTIC ACID, PLASMA          Imaging Results    None          Medications   sodium chloride 0.9% bolus 1,000 mL 1,000 mL (0 mLs Intravenous Stopped 7/10/25 1910)   sodium chloride 0.9% bolus 1,000 mL 1,000 mL (0 mLs Intravenous Stopped  7/10/25 2025)     Medical Decision Making  30-year-old male presents to the emergency department for evaluation of right great toe blister.  Reports that he is wearing steel-toed boots while cutting grass without socks and the blister popped and began bleeding.  Reports that he is a diabetic and is concerned for wound healing.  Also states that he did not take diabetic medication today and blood sugar is high.  Denies nausea, vomiting, confusion, fever, chills.  Labs, urinalysis ordered   2 L normal saline ordered in ED  Wound care provided to right great toe   Follow up and return precautions discussed with patient, who verbalized understanding   Diagnosis: Right great toe blister      Amount and/or Complexity of Data Reviewed  Labs: ordered.                                      Clinical Impression:   Final diagnoses:  [S90.421A] Blister (nonthermal), right great toe, initial encounter (Primary)        ED Disposition Condition    Discharge Stable          ED Prescriptions    None       Follow-up Information    None            [1]   Social History  Tobacco Use    Smoking status: Every Day     Types: Cigarettes    Smokeless tobacco: Never   Vaping Use    Vaping status: Never Used   Substance Use Topics    Alcohol use: Yes     Comment: occasionally    Drug use: Yes     Types: Marijuana     Comment: 1 day ago use        Osvaldo Her NP  07/10/25 [id number]

## 2025-07-11 NOTE — DISCHARGE INSTRUCTIONS
Follow-up with primary care provider in the morning for an appointment for re-evaluation. Return to the emergency department for new or worsening symptoms.

## 2025-07-12 NOTE — PROGRESS NOTES
"Subjective:       Patient ID: Low Gutierrez is a 30 y.o. male.    Chief Complaint: Abrasion to right great toe     Presents to clinic with complaint of ruptured and possibly infected blister on the plantar surface of the right great toe.  He has a history of type 1 diabetes.  He reports he was wearing steel-toed boots while cutting grass without socks and the blister popped and began bleeding  a few days ago.  He went to the emergency room in the last couple of days as he was afraid it might be trying to get infected.  He was not prescribed antibiotics.  He denies fever or chills.        Review of Systems   Constitutional: Negative.    Respiratory: Negative.     Cardiovascular: Negative.           Reviewed family, medical, surgical, and social history.    Objective:      /80 (BP Location: Right arm, Patient Position: Sitting)   Pulse 99   Temp 98.7 °F (37.1 °C) (Oral)   Resp 20   Ht 6' 4" (1.93 m)   Wt 71.2 kg (157 lb)   SpO2 98%   BMI 19.11 kg/m²   Physical Exam  Vitals and nursing note reviewed.   Constitutional:       General: He is not in acute distress.     Appearance: Normal appearance. He is not ill-appearing, toxic-appearing or diaphoretic.   HENT:      Head: Normocephalic.      Mouth/Throat:      Mouth: Mucous membranes are moist.   Cardiovascular:      Rate and Rhythm: Normal rate and regular rhythm.      Heart sounds: Normal heart sounds.   Pulmonary:      Effort: Pulmonary effort is normal.      Breath sounds: Normal breath sounds.   Musculoskeletal:      Cervical back: Normal range of motion and neck supple.   Feet:      Comments:   There is a draining /ruptured blister on the dorsum of the right great toe that covers most of the surface of the toe.  It is draining purulent drainage and has an odor.  There is no redness but there is definitely tenderness.  Pedal pulses are intact.  Sensation is intact.  Skin:     General: Skin is warm and dry.      Capillary Refill: Capillary refill " takes less than 2 seconds.   Neurological:      Mental Status: He is alert and oriented to person, place, and time.   Psychiatric:         Mood and Affect: Mood normal.         Behavior: Behavior normal.         Thought Content: Thought content normal.         Judgment: Judgment normal.            No visits with results within 1 Day(s) from this visit.   Latest known visit with results is:   Admission on 07/10/2025, Discharged on 07/10/2025   Component Date Value Ref Range Status    Sodium 07/10/2025 133 (L)  136 - 145 mmol/L Final    Potassium 07/10/2025 3.9  3.5 - 5.1 mmol/L Final    Chloride 07/10/2025 98  98 - 107 mmol/L Final    CO2 07/10/2025 26  22 - 29 mmol/L Final    Anion Gap 07/10/2025 13  7 - 16 mmol/L Final    Glucose 07/10/2025 448 (H)  74 - 100 mg/dL Final    BUN 07/10/2025 9  9 - 21 mg/dL Final    Creatinine 07/10/2025 1.30 (H)  0.72 - 1.25 mg/dL Final    BUN/Creatinine Ratio 07/10/2025 7  6 - 20 Final    Calcium 07/10/2025 8.8  8.4 - 10.2 mg/dL Final    Total Protein 07/10/2025 7.6  6.4 - 8.3 g/dL Final    Albumin 07/10/2025 4.2  3.5 - 5.0 g/dL Final    Globulin 07/10/2025 3.4  2.0 - 4.0 g/dL Final    A/G Ratio 07/10/2025 1.2   Final    Bilirubin, Total 07/10/2025 0.6  <=1.5 mg/dL Final    Alk Phos 07/10/2025 88  40 - 150 U/L Final    ALT 07/10/2025 11  <=55 U/L Final    AST 07/10/2025 18  11 - 45 U/L Final    eGFR 07/10/2025 76  >=60 mL/min/1.73m2 Final    Estimated GFR calculated using the CKD-EPI creatinine (2021) equation.    Acetone 07/10/2025 Negative   Final    Color, UA 07/10/2025 Light Yellow  Colorless, Straw, Yellow, Dark Yellow, Light Yellow Final    Clarity, UA 07/10/2025 Clear  Clear Final    pH, UA 07/10/2025 6.5  5.0 to 8.0 pH Units Final    Leukocytes, UA 07/10/2025 Negative  Negative Final    Nitrites, UA 07/10/2025 Negative  Negative Final    Protein, UA 07/10/2025 Negative  Negative Final    Glucose, UA 07/10/2025 >1000 (A)  Normal mg/dL Final    Ketones, UA 07/10/2025 Negative   Negative mg/dL Final    Urobilinogen, UA 07/10/2025 2 (A)  0.2, 1.0, Normal mg/dL Final    Bilirubin, UA 07/10/2025 Negative  Negative Final    Blood, UA 07/10/2025 Negative  Negative Final    Specific Gravity, UA 07/10/2025 1.046 (H)  <=1.030 Final    Lactic Acid 07/10/2025 3.2 (H)  0.5 - 2.2 mmol/L Final      A repeat order for Lactic Acid has been placed for collection in 3 hours.    Magnesium 07/10/2025 2.0  1.6 - 2.6 mg/dL Final    Phosphorus 07/10/2025 3.7  2.3 - 4.7 mg/dL Final    WBC 07/10/2025 5.12  4.50 - 11.00 K/uL Final    RBC 07/10/2025 4.50 (L)  4.60 - 6.20 M/uL Final    Hemoglobin 07/10/2025 13.6  13.5 - 18.0 g/dL Final    Hematocrit 07/10/2025 41.0  40.0 - 54.0 % Final    MCV 07/10/2025 91.1  80.0 - 96.0 fL Final    MCH 07/10/2025 30.2  27.0 - 31.0 pg Final    MCHC 07/10/2025 33.2  32.0 - 36.0 g/dL Final    RDW 07/10/2025 11.9  11.5 - 14.5 % Final    Platelet Count 07/10/2025 220  150 - 400 K/uL Final    MPV 07/10/2025 11.8  9.4 - 12.4 fL Final    Neutrophils % 07/10/2025 54.5  53.0 - 65.0 % Final    Lymphocytes % 07/10/2025 37.7  27.0 - 41.0 % Final    Monocytes % 07/10/2025 6.8 (H)  2.0 - 6.0 % Final    Eosinophils % 07/10/2025 0.2 (L)  1.0 - 4.0 % Final    Basophils % 07/10/2025 0.6  0.0 - 1.0 % Final    Immature Granulocytes % 07/10/2025 0.2  0.0 - 0.4 % Final    nRBC, Auto 07/10/2025 0.0  <=0.0 % Final    Neutrophils, Abs 07/10/2025 2.79  1.80 - 7.70 K/uL Final    Lymphocytes, Absolute 07/10/2025 1.93  1.00 - 4.80 K/uL Final    Monocytes, Absolute 07/10/2025 0.35  0.00 - 0.80 K/uL Final    Eosinophils, Absolute 07/10/2025 0.01  0.00 - 0.50 K/uL Final    Basophils, Absolute 07/10/2025 0.03  0.00 - 0.20 K/uL Final    Immature Granulocytes, Absolute 07/10/2025 0.01  0.00 - 0.04 K/uL Final    nRBC, Absolute 07/10/2025 0.00  <=0.00 x10e3/uL Final    Diff Type 07/10/2025 Auto   Final    POC Glucose 07/10/2025 445 (H)  70 - 105 mg/dL Final    POC PH 07/10/2025 7.37  7.32 - 7.42 Final    POC PCO2  07/10/2025 54 (H)  41 - 51 mmHg Final    POC PO2 07/10/2025 26  25 - 40 mmHg Final    POC Sodium 07/10/2025 133 (L)  136 - 145 mmol/L Final    POC Potassium 07/10/2025 3.8  3.4 - 4.5 mmol/L Final    POC Ionized Calcium 07/10/2025 1.14 (L)  1.15 - 1.35 mmol/L Final    POCT Glucose 07/10/2025 402 (H)  60 - 95 mg/dL Final    POC Lactate 07/10/2025 2.4 (H)  0.3 - 1.2 mmol/L Final    POC Hematocrit 07/10/2025 42  35 - 51 % Final    POC HCO3 07/10/2025 31.2 (H)  24.0 - 28.0 mmol/L Final    POC CO2 07/10/2025 32.9  mmol/L Final    POC Base Excess 07/10/2025 4.6 (H)  -2.0 - 3.0 mmol/L Final    POC SATURATED O2 07/10/2025 46  40 - 70 % Final    POC Glucose 07/10/2025 250 (H)  70 - 105 mg/dL Final      Assessment:       1. Infected blister of great toe of right foot, initial encounter        Plan:       Infected blister of great toe of right foot, initial encounter  -     sulfamethoxazole-trimethoprim 800-160mg (BACTRIM DS) 800-160 mg Tab; Take 1 tablet by mouth 2 (two) times daily. for 10 days  Dispense: 20 tablet; Refill: 0  -     mupirocin (BACTROBAN) 2 % ointment; Apply topically 2 (two) times daily.  Dispense: 60 g; Refill: 0  -     Ambulatory referral/consult to Wound Clinic; Future; Expected date: 07/18/2025    Follow up with us if wound care has not called you within 2-3 days  Keep foot clean and dry.  Wash wound daily with soap and water.  Keep covered as long as draining.    Wound care was provided in clinic with a new dressing   Return to clinic as needed          Risks, benefits, and side effects were discussed with the patient. All questions were answered to the fullest satisfaction of the patient, and pt verbalized understanding and agreement to treatment plan. Pt was to call with any new or worsening symptoms, or present to the ER.